# Patient Record
Sex: MALE | Race: WHITE | Employment: OTHER | URBAN - METROPOLITAN AREA
[De-identification: names, ages, dates, MRNs, and addresses within clinical notes are randomized per-mention and may not be internally consistent; named-entity substitution may affect disease eponyms.]

---

## 2018-05-09 ENCOUNTER — OFFICE VISIT (OUTPATIENT)
Dept: NEUROLOGY | Age: 83
End: 2018-05-09

## 2018-05-09 DIAGNOSIS — G62.9 PERIPHERAL NERVE DISORDER: Primary | ICD-10-CM

## 2018-05-09 DIAGNOSIS — M54.16 LUMBAR RADICULOPATHY: ICD-10-CM

## 2018-05-09 NOTE — PROGRESS NOTES
This was an elective EMG and nerve conduction for considerations of neuropathy and/or radiculopathy. Patient history includes a brief note about patient's subjective note of abnormal sensibility in the soles of both feet. No known history of diabetes. Remote history of seeing neurologist in [place] but I am not sure with the association was at the time? Has never had an EMG and nerve conduction test accomplished. EMG and nerve conduction findings. 1.  EMG portion involve sampling representative muscles of both lower extremities and the associated paraspinal groups. Needle insertion and probing was unrevealing and did not elicit spontaneous activity. No acute denervation chronic denervation/reinnervation or myopathic potentials seen. Motor unit recruitment as to number morphology and time sequencing was all normal.  Patient tolerated the procedure extremely well and there was no compromise as to exam quality based on patient responses. 2.  The nerve conduction part showed no sensory's obtained for either lower extremity. There was a slight decrease nerve conduction velocity for both tibial motors at the knees. There was a prolonged right tibial F wave of slight nature. Normal H response on the left and slightly prolonged on the left in comparison. 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 The prolonged slight delay in right tibial F wave compared to left is of uncertain if any significance. Also the right H response compared to left is of uncertain, if any significance, in this case. Clinical correlation is advised.   BIJAL COURTNEY.

## 2018-05-09 NOTE — PROGRESS NOTES
EMG/ NCS Report   [facility]  [facility], [address] [place], [address]   Ph: [phone]   FAX: [phone]/ [phone]  Test Date:  2018    Patient: Germán Parra : 1935 Physician: Jorge Montenegro, Lorenza Lundborg, MD   Sex: Male Height: ' \" Ref Phys: Andrew Vargas D.P.M   ID#: [id number] Weight:  lbs. Technician: Haim Norman     Patient History / Exam:  CC:NEUROPATHY VS RADICULOPATHY          EMG & NCV Findings:  Evaluation of the left Fibular motor nerve showed normal distal onset latency (3.7 ms), reduced amplitude (0.8 mV), normal conduction velocity (B Fib-Ankle, 41 m/s), and normal conduction velocity (Poplt-B Fib, 63 m/s). The right Fibular motor nerve showed normal distal onset latency (3.7 ms), normal amplitude (1.1 mV), normal conduction velocity (B Fib-Ankle, 41 m/s), and normal conduction velocity (Poplt-B Fib, 50 m/s). The left tibial motor and the right tibial motor nerves showed normal distal onset latency (L4.4, R5.2 ms), normal amplitude (L1.8, R1.4 mV), and decreased conduction velocity (Knee-Ankle, L35, R36 m/s). The left Sup Fibular sensory nerve showed no response (Lower leg) and no response (Site 2). The right Sup Fibular sensory nerve showed no response (Lower leg), no response (Site 2), and no response (Site 3). The left sural sensory and the right sural sensory nerves showed no response (Calf) and no response (Site 2). F Wave studies indicate that the right tibial F wave has prolonged latency (58.39 ms). All remaining F Wave latencies were within normal limits. Left vs. Right comparison data for the tibial F wave indicates abnormal L-R latency difference (6.08 ms). Left vs. Right comparison data for the tibial H-reflex indicates abnormal L-R latency difference (8.13 ms). All examined muscles (as indicated in the following table) showed no evidence of electrical instability. Impression:        ___________________________  Yasmin Cherry IV, MD      Nerve Conduction Studies  Anti Sensory Summary Table     Stim Site NR Peak (ms) Norm Peak (ms) P-T Amp (µV) Norm P-T Amp Site1 Site2 Dist (cm)   Left Sup Fibular Anti Sensory (Lat ankle)  32.9°C   Lower leg NR  <4.6  >4 Lower leg Lat ankle 10.0   Site 2 NR          Right Sup Fibular Anti Sensory (Lat ankle)  32.9°C   Lower leg NR  <4.6  >4 Lower leg Lat ankle 10.0   Site 2 NR          Site 3 NR          Left Sural Anti Sensory (Lat Mall)  33.9°C   Calf NR  <4.5  >4.0 Calf Lat Mall 14.0   Site 2 NR          Right Sural Anti Sensory (Lat Mall)  34.5°C   Calf NR  <4.5  >4.0 Calf Lat Mall 14.0   Site 2 NR            Motor Summary Table     Stim Site NR Onset (ms) Norm Onset (ms) O-P Amp (mV) Norm O-P Amp Amp (Prev) (%) Site1 Site2 Dist (cm) Kartik (m/s) Norm Kartik (m/s)   Left Fibular Motor (Ext Dig Brev)  31.9°C   Ankle    3.7 <6.5 0.8 >1.1 100.0 Ankle Ext Dig Brev 8.0     B Fib    12.0  0.8  100.0 B Fib Ankle 34.0 41 >38   Poplt    13.6  0.8  100.0 Poplt B Fib 10.0 63 >42   Right Fibular Motor (Ext Dig Brev)  32.3°C   Ankle    3.7 <6.5 1.1 >1.1 100.0 Ankle Ext Dig Brev 8.0     B Fib    12.0  1.2  109.1 B Fib Ankle 34.0 41 >38   Poplt    14.0  1.2  100.0 Poplt B Fib 10.0 50 >42   Left Tibial Motor (Abd Soto Brev)  31.2°C   Ankle    4.4 <6.1 1.8 >1.1 100.0 Ankle Abd Soto Brev 8.0     Knee    15.6  1.4  77.8 Knee Ankle 39.0 35 >39   Right Tibial Motor (Abd Soto Brev)  31.9°C   Ankle    5.2 <6.1 1.4 >1.1 100.0 Ankle Abd Soto Brev 8.0     Knee    16.2  0.9  64.3 Knee Ankle 40.0 36 >39     F Wave Studies     NR F-Lat (ms) Lat Norm (ms) L-R F-Lat (ms) L-R Lat Norm   Left Tibial (Mrkrs) (Abd Hallucis)  31.1°C      52.31 <56 6.08 <5.7   Right Tibial (Mrkrs) (Abd Hallucis)  31.8°C      58.39 <56 6.08 <5.7     H Reflex Studies     NR H-Lat (ms) L-R H-Lat (ms) L-R Lat Norm   Left Tibial (Gastroc)  31.1°C      31.87 8.13 <2.0   Right Tibial (Gastroc) 31.5°C      40.00 8.13 <2.0     EMG     Side Muscle Nerve Root Ins Act Fibs Psw Recrt Duration Amp Poly Comment   Right Ext Dig Brev Dp Br Peron L5, S1 Nml Nml Nml Nml Nml Nml Nml    Right AntTibialis Dp Br Peron L4-5 Nml Nml Nml Nml Nml Nml Nml    Right MedGastroc Tibial S1-2 Nml Nml Nml Nml Nml Nml Nml    Right VastusMed Femoral L2-4 Nml Nml Nml Nml Nml Nml Nml    Right BicepsFemL Sciatic L5-S2 Nml Nml Nml Nml Nml Nml Nml    Left Ext Dig Brev Dp Br Peron L5, S1 Nml Nml Nml Nml Nml Nml Nml    Left AntTibialis Dp Br Peron L4-5 Nml Nml Nml Nml Nml Nml Nml    Left MedGastroc Tibial S1-2 Nml Nml Nml Nml Nml Nml Nml    Left VastusMed Femoral L2-4 Nml Nml Nml Nml Nml Nml Nml    Left BicepsFemL Sciatic L5-S2 Nml Nml Nml Nml Nml Nml Nml    Left Mid Lumb Parasp Rami L4,5 Nml Nml Nml Nml Nml Nml Nml    Right Mid Lumb Parasp Rami L4,5 Nml Nml Nml Nml Nml Nml Nml                Nerve Conduction Studies  Anti Sensory Left/Right Comparison     Stim Site L Lat (ms) R Lat (ms) L-R Lat (ms) L Amp (µV) R Amp (µV) L-R Amp (%) Site1 Site2 L Kartik (m/s) R Kartik (m/s) L-R Kartik (m/s)   Sup Fibular Anti Sensory (Lat ankle)  32.9°C   Lower leg       Lower leg Lat ankle      Site 2              Sural Anti Sensory (Lat Mall)  33.9°C   Calf       Calf Lat Mall      Site 2                Motor Left/Right Comparison     Stim Site L Lat (ms) R Lat (ms) L-R Lat (ms) L Amp (mV) R Amp (mV) L-R Amp (%) Site1 Site2 L Kartik (m/s) R Kartik (m/s) L-R Kartik (m/s)   Fibular Motor (Ext Dig Brev)  31.9°C   Ankle 3.7 3.7 0.0 0.8 1.1 27.3 Ankle Ext Dig Brev      B Fib 12.0 12.0 0.0 0.8 1.2 33.3 B Fib Ankle 41 41 0   Poplt 13.6 14.0 0.4 0.8 1.2 33.3 Poplt B Fib 63 50 13   Tibial Motor (Abd Soto Brev)  31.2°C   Ankle 4.4 5.2 0.8 1.8 1.4 22.2 Ankle Abd Soto Brev      Knee 15.6 16.2 0.6 1.4 0.9 35.7 Knee Ankle 35 36 1         Waveforms:

## 2020-07-17 ENCOUNTER — HOSPITAL ENCOUNTER (OUTPATIENT)
Dept: MRI IMAGING | Age: 85
Discharge: HOME | End: 2020-07-17
Attending: PHYSICAL MEDICINE & REHABILITATION
Payer: MEDICARE

## 2020-07-17 DIAGNOSIS — Z79.01 ANTICOAGULATED ON COUMADIN: ICD-10-CM

## 2020-07-17 DIAGNOSIS — M54.50 LOW BACK PAIN: ICD-10-CM

## 2020-07-17 DIAGNOSIS — M54.16 RADICULOPATHY, LUMBAR REGION: ICD-10-CM

## 2020-07-17 DIAGNOSIS — G89.11 ACUTE PAIN DUE TO TRAUMA: ICD-10-CM

## 2020-07-17 PROCEDURE — 72148 MRI LUMBAR SPINE W/O DYE: CPT

## 2023-01-01 ENCOUNTER — HOME CARE VISIT (OUTPATIENT)
Age: 88
End: 2023-01-01
Payer: MEDICARE

## 2023-01-01 ENCOUNTER — HOSPICE ADMISSION (OUTPATIENT)
Age: 88
End: 2023-01-01
Payer: MEDICARE

## 2023-01-01 VITALS
RESPIRATION RATE: 36 BRPM | DIASTOLIC BLOOD PRESSURE: 62 MMHG | TEMPERATURE: 97.7 F | SYSTOLIC BLOOD PRESSURE: 132 MMHG | HEART RATE: 87 BPM

## 2023-01-01 PROCEDURE — 0651 HSPC ROUTINE HOME CARE

## 2023-01-01 PROCEDURE — 2500000001 HSPC NON INJECTABLE MED

## 2023-01-01 PROCEDURE — 0656 HSPC GENERAL INPATIENT

## 2023-01-01 PROCEDURE — G0299 HHS/HOSPICE OF RN EA 15 MIN: HCPCS

## 2023-01-01 ASSESSMENT — ENCOUNTER SYMPTOMS
COUGH: 1
COUGH CHARACTERISTICS: MOIST

## 2023-09-10 ENCOUNTER — APPOINTMENT (OUTPATIENT)
Facility: HOSPITAL | Age: 88
DRG: 280 | End: 2023-09-10
Payer: MEDICARE

## 2023-09-10 ENCOUNTER — HOSPITAL ENCOUNTER (INPATIENT)
Facility: HOSPITAL | Age: 88
LOS: 4 days | Discharge: HOME | DRG: 280 | End: 2023-09-14
Attending: STUDENT IN AN ORGANIZED HEALTH CARE EDUCATION/TRAINING PROGRAM | Admitting: HOSPITALIST
Payer: MEDICARE

## 2023-09-10 DIAGNOSIS — J96.02 ACUTE RESPIRATORY FAILURE WITH HYPOXIA AND HYPERCAPNIA (HCC): Primary | ICD-10-CM

## 2023-09-10 DIAGNOSIS — R07.9 CHEST PAIN: ICD-10-CM

## 2023-09-10 DIAGNOSIS — J96.01 ACUTE RESPIRATORY FAILURE WITH HYPOXIA (HCC): ICD-10-CM

## 2023-09-10 DIAGNOSIS — J96.01 ACUTE RESPIRATORY FAILURE WITH HYPOXIA AND HYPERCAPNIA (HCC): Primary | ICD-10-CM

## 2023-09-10 DIAGNOSIS — R77.8 ELEVATED TROPONIN: ICD-10-CM

## 2023-09-10 DIAGNOSIS — J81.0 ACUTE PULMONARY EDEMA (HCC): ICD-10-CM

## 2023-09-10 DIAGNOSIS — N17.9 AKI (ACUTE KIDNEY INJURY) (HCC): ICD-10-CM

## 2023-09-10 LAB
ALBUMIN SERPL-MCNC: 3.8 G/DL (ref 3.5–5)
ALBUMIN/GLOB SERPL: 0.8 (ref 1.1–2.2)
ALP SERPL-CCNC: 74 U/L (ref 45–117)
ALT SERPL-CCNC: 19 U/L (ref 12–78)
ANION GAP SERPL CALC-SCNC: 9 MMOL/L (ref 5–15)
APPEARANCE UR: CLEAR
AST SERPL-CCNC: 31 U/L (ref 15–37)
BACTERIA URNS QL MICRO: NEGATIVE /HPF
BASOPHILS # BLD: 0.1 K/UL (ref 0–0.1)
BASOPHILS NFR BLD: 0 % (ref 0–1)
BILIRUB SERPL-MCNC: 0.7 MG/DL (ref 0.2–1)
BILIRUB UR QL: NEGATIVE
BUN SERPL-MCNC: 24 MG/DL (ref 6–20)
BUN/CREAT SERPL: 14 (ref 12–20)
CALCIUM SERPL-MCNC: 8.7 MG/DL (ref 8.5–10.1)
CHLORIDE SERPL-SCNC: 102 MMOL/L (ref 97–108)
CHOLEST SERPL-MCNC: 127 MG/DL
CK SERPL-CCNC: 92 U/L (ref 39–308)
CO2 SERPL-SCNC: 29 MMOL/L (ref 21–32)
COLOR UR: NORMAL
COMMENT:: NORMAL
CREAT SERPL-MCNC: 1.67 MG/DL (ref 0.7–1.3)
DIFFERENTIAL METHOD BLD: ABNORMAL
EKG DIAGNOSIS: NORMAL
EKG DIAGNOSIS: NORMAL
EKG Q-T INTERVAL: 376 MS
EKG Q-T INTERVAL: 466 MS
EKG QRS DURATION: 88 MS
EKG QRS DURATION: 90 MS
EKG QTC CALCULATION (BAZETT): 454 MS
EKG QTC CALCULATION (BAZETT): 499 MS
EKG R AXIS: -34 DEGREES
EKG R AXIS: -43 DEGREES
EKG T AXIS: 103 DEGREES
EKG T AXIS: 188 DEGREES
EKG VENTRICULAR RATE: 69 BPM
EKG VENTRICULAR RATE: 88 BPM
EOSINOPHIL # BLD: 0.1 K/UL (ref 0–0.4)
EOSINOPHIL NFR BLD: 1 % (ref 0–7)
EPITH CASTS URNS QL MICRO: NORMAL /LPF
ERYTHROCYTE [DISTWIDTH] IN BLOOD BY AUTOMATED COUNT: 14.8 % (ref 11.5–14.5)
ERYTHROCYTE [DISTWIDTH] IN BLOOD BY AUTOMATED COUNT: 15.1 % (ref 11.5–14.5)
GLOBULIN SER CALC-MCNC: 5 G/DL (ref 2–4)
GLUCOSE SERPL-MCNC: 160 MG/DL (ref 65–100)
GLUCOSE UR STRIP.AUTO-MCNC: NEGATIVE MG/DL
HCO3 BLDV-SCNC: 28.1 MMOL/L (ref 23–28)
HCT VFR BLD AUTO: 42.7 % (ref 36.6–50.3)
HCT VFR BLD AUTO: 47.5 % (ref 36.6–50.3)
HDLC SERPL-MCNC: 52 MG/DL
HDLC SERPL: 2.4 (ref 0–5)
HGB BLD-MCNC: 14.1 G/DL (ref 12.1–17)
HGB BLD-MCNC: 15.4 G/DL (ref 12.1–17)
HGB UR QL STRIP: NEGATIVE
HYALINE CASTS URNS QL MICRO: NORMAL /LPF (ref 0–2)
IMM GRANULOCYTES # BLD AUTO: 0.1 K/UL (ref 0–0.04)
IMM GRANULOCYTES NFR BLD AUTO: 1 % (ref 0–0.5)
INR PPP: 2.5 (ref 0.9–1.1)
KETONES UR QL STRIP.AUTO: NEGATIVE MG/DL
LACTATE SERPL-SCNC: 1.2 MMOL/L (ref 0.4–2)
LDLC SERPL CALC-MCNC: 60.8 MG/DL (ref 0–100)
LEUKOCYTE ESTERASE UR QL STRIP.AUTO: NEGATIVE
LYMPHOCYTES # BLD: 2.4 K/UL (ref 0.8–3.5)
LYMPHOCYTES NFR BLD: 21 % (ref 12–49)
MCH RBC QN AUTO: 28.9 PG (ref 26–34)
MCH RBC QN AUTO: 29.6 PG (ref 26–34)
MCHC RBC AUTO-ENTMCNC: 32.4 G/DL (ref 30–36.5)
MCHC RBC AUTO-ENTMCNC: 33 G/DL (ref 30–36.5)
MCV RBC AUTO: 87.5 FL (ref 80–99)
MCV RBC AUTO: 91.2 FL (ref 80–99)
MONOCYTES # BLD: 0.6 K/UL (ref 0–1)
MONOCYTES NFR BLD: 5 % (ref 5–13)
NEUTS SEG # BLD: 8.6 K/UL (ref 1.8–8)
NEUTS SEG NFR BLD: 72 % (ref 32–75)
NITRITE UR QL STRIP.AUTO: NEGATIVE
NRBC # BLD: 0 K/UL (ref 0–0.01)
NRBC # BLD: 0 K/UL (ref 0–0.01)
NRBC BLD-RTO: 0 PER 100 WBC
NRBC BLD-RTO: 0 PER 100 WBC
NT PRO BNP: 1147 PG/ML (ref 0–450)
PCO2 BLDV: 57 MMHG (ref 41–51)
PH BLDV: 7.3 (ref 7.32–7.42)
PH UR STRIP: 7 (ref 5–8)
PLATELET # BLD AUTO: 276 K/UL (ref 150–400)
PLATELET # BLD AUTO: 291 K/UL (ref 150–400)
PMV BLD AUTO: 10.2 FL (ref 8.9–12.9)
PMV BLD AUTO: 10.2 FL (ref 8.9–12.9)
PO2 BLDV: <13 MMHG (ref 25–40)
POTASSIUM SERPL-SCNC: 4.1 MMOL/L (ref 3.5–5.1)
PROT SERPL-MCNC: 8.8 G/DL (ref 6.4–8.2)
PROT UR STRIP-MCNC: NEGATIVE MG/DL
PROTHROMBIN TIME: 23.7 SEC (ref 9–11.1)
RBC # BLD AUTO: 4.88 M/UL (ref 4.1–5.7)
RBC # BLD AUTO: 5.21 M/UL (ref 4.1–5.7)
RBC #/AREA URNS HPF: NORMAL /HPF (ref 0–5)
SARS-COV-2 RDRP RESP QL NAA+PROBE: NOT DETECTED
SERVICE CMNT-IMP: ABNORMAL
SODIUM SERPL-SCNC: 140 MMOL/L (ref 136–145)
SOURCE: NORMAL
SP GR UR REFRACTOMETRY: 1.01 (ref 1–1.03)
SPECIMEN HOLD: NORMAL
SPECIMEN TYPE: ABNORMAL
TRIGL SERPL-MCNC: 71 MG/DL
TROPONIN I SERPL HS-MCNC: 341 NG/L (ref 0–76)
TROPONIN I SERPL HS-MCNC: 4127 NG/L (ref 0–76)
TROPONIN I SERPL HS-MCNC: ABNORMAL NG/L (ref 0–76)
TROPONIN I SERPL HS-MCNC: ABNORMAL NG/L (ref 0–76)
UFH PPP CHRO-ACNC: 0.57 IU/ML
UFH PPP CHRO-ACNC: <0.1 IU/ML
URINE CULTURE IF INDICATED: NORMAL
UROBILINOGEN UR QL STRIP.AUTO: 0.2 EU/DL (ref 0.2–1)
VLDLC SERPL CALC-MCNC: 14.2 MG/DL
WBC # BLD AUTO: 11.9 K/UL (ref 4.1–11.1)
WBC # BLD AUTO: 9.8 K/UL (ref 4.1–11.1)
WBC URNS QL MICRO: NORMAL /HPF (ref 0–4)

## 2023-09-10 PROCEDURE — 6370000000 HC RX 637 (ALT 250 FOR IP): Performed by: STUDENT IN AN ORGANIZED HEALTH CARE EDUCATION/TRAINING PROGRAM

## 2023-09-10 PROCEDURE — 85610 PROTHROMBIN TIME: CPT

## 2023-09-10 PROCEDURE — 71045 X-RAY EXAM CHEST 1 VIEW: CPT

## 2023-09-10 PROCEDURE — 83605 ASSAY OF LACTIC ACID: CPT

## 2023-09-10 PROCEDURE — 2580000003 HC RX 258: Performed by: HOSPITALIST

## 2023-09-10 PROCEDURE — 93005 ELECTROCARDIOGRAM TRACING: CPT | Performed by: STUDENT IN AN ORGANIZED HEALTH CARE EDUCATION/TRAINING PROGRAM

## 2023-09-10 PROCEDURE — 94660 CPAP INITIATION&MGMT: CPT

## 2023-09-10 PROCEDURE — 93010 ELECTROCARDIOGRAM REPORT: CPT | Performed by: STUDENT IN AN ORGANIZED HEALTH CARE EDUCATION/TRAINING PROGRAM

## 2023-09-10 PROCEDURE — 6370000000 HC RX 637 (ALT 250 FOR IP): Performed by: INTERNAL MEDICINE

## 2023-09-10 PROCEDURE — 6360000002 HC RX W HCPCS: Performed by: HOSPITALIST

## 2023-09-10 PROCEDURE — 6360000002 HC RX W HCPCS: Performed by: STUDENT IN AN ORGANIZED HEALTH CARE EDUCATION/TRAINING PROGRAM

## 2023-09-10 PROCEDURE — 81001 URINALYSIS AUTO W/SCOPE: CPT

## 2023-09-10 PROCEDURE — 82803 BLOOD GASES ANY COMBINATION: CPT

## 2023-09-10 PROCEDURE — 51701 INSERT BLADDER CATHETER: CPT

## 2023-09-10 PROCEDURE — 83880 ASSAY OF NATRIURETIC PEPTIDE: CPT

## 2023-09-10 PROCEDURE — 2700000000 HC OXYGEN THERAPY PER DAY

## 2023-09-10 PROCEDURE — 94640 AIRWAY INHALATION TREATMENT: CPT

## 2023-09-10 PROCEDURE — 85520 HEPARIN ASSAY: CPT

## 2023-09-10 PROCEDURE — 80053 COMPREHEN METABOLIC PANEL: CPT

## 2023-09-10 PROCEDURE — 5A09357 ASSISTANCE WITH RESPIRATORY VENTILATION, LESS THAN 24 CONSECUTIVE HOURS, CONTINUOUS POSITIVE AIRWAY PRESSURE: ICD-10-PCS | Performed by: INTERNAL MEDICINE

## 2023-09-10 PROCEDURE — 85025 COMPLETE CBC W/AUTO DIFF WBC: CPT

## 2023-09-10 PROCEDURE — 87635 SARS-COV-2 COVID-19 AMP PRB: CPT

## 2023-09-10 PROCEDURE — 2000000000 HC ICU R&B

## 2023-09-10 PROCEDURE — 99223 1ST HOSP IP/OBS HIGH 75: CPT | Performed by: STUDENT IN AN ORGANIZED HEALTH CARE EDUCATION/TRAINING PROGRAM

## 2023-09-10 PROCEDURE — 94761 N-INVAS EAR/PLS OXIMETRY MLT: CPT

## 2023-09-10 PROCEDURE — 87040 BLOOD CULTURE FOR BACTERIA: CPT

## 2023-09-10 PROCEDURE — 99285 EMERGENCY DEPT VISIT HI MDM: CPT

## 2023-09-10 PROCEDURE — 82550 ASSAY OF CK (CPK): CPT

## 2023-09-10 PROCEDURE — 36415 COLL VENOUS BLD VENIPUNCTURE: CPT

## 2023-09-10 PROCEDURE — 6370000000 HC RX 637 (ALT 250 FOR IP): Performed by: HOSPITALIST

## 2023-09-10 PROCEDURE — 80061 LIPID PANEL: CPT

## 2023-09-10 PROCEDURE — 84484 ASSAY OF TROPONIN QUANT: CPT

## 2023-09-10 PROCEDURE — 51798 US URINE CAPACITY MEASURE: CPT

## 2023-09-10 PROCEDURE — 85027 COMPLETE CBC AUTOMATED: CPT

## 2023-09-10 PROCEDURE — 93010 ELECTROCARDIOGRAM REPORT: CPT | Performed by: SPECIALIST

## 2023-09-10 RX ORDER — ATORVASTATIN CALCIUM 20 MG/1
40 TABLET, FILM COATED ORAL NIGHTLY
Status: DISCONTINUED | OUTPATIENT
Start: 2023-09-10 | End: 2023-09-10

## 2023-09-10 RX ORDER — FUROSEMIDE 20 MG/1
20 TABLET ORAL DAILY
Status: ON HOLD | COMMUNITY
End: 2023-09-14 | Stop reason: HOSPADM

## 2023-09-10 RX ORDER — FUROSEMIDE 10 MG/ML
40 INJECTION INTRAMUSCULAR; INTRAVENOUS DAILY
Status: DISCONTINUED | OUTPATIENT
Start: 2023-09-10 | End: 2023-09-11

## 2023-09-10 RX ORDER — ONDANSETRON 4 MG/1
4 TABLET, ORALLY DISINTEGRATING ORAL EVERY 8 HOURS PRN
Status: DISCONTINUED | OUTPATIENT
Start: 2023-09-10 | End: 2023-09-14 | Stop reason: HOSPADM

## 2023-09-10 RX ORDER — IPRATROPIUM BROMIDE AND ALBUTEROL SULFATE 2.5; .5 MG/3ML; MG/3ML
1 SOLUTION RESPIRATORY (INHALATION) ONCE
Status: COMPLETED | OUTPATIENT
Start: 2023-09-10 | End: 2023-09-10

## 2023-09-10 RX ORDER — ASPIRIN 81 MG/1
162 TABLET, CHEWABLE ORAL
Status: DISCONTINUED | OUTPATIENT
Start: 2023-09-10 | End: 2023-09-10

## 2023-09-10 RX ORDER — ONDANSETRON 2 MG/ML
4 INJECTION INTRAMUSCULAR; INTRAVENOUS EVERY 6 HOURS PRN
Status: DISCONTINUED | OUTPATIENT
Start: 2023-09-10 | End: 2023-09-14 | Stop reason: HOSPADM

## 2023-09-10 RX ORDER — PANTOPRAZOLE SODIUM 40 MG/1
40 TABLET, DELAYED RELEASE ORAL
Status: DISCONTINUED | OUTPATIENT
Start: 2023-09-10 | End: 2023-09-14 | Stop reason: HOSPADM

## 2023-09-10 RX ORDER — HEPARIN SODIUM 1000 [USP'U]/ML
4000 INJECTION, SOLUTION INTRAVENOUS; SUBCUTANEOUS PRN
Status: DISCONTINUED | OUTPATIENT
Start: 2023-09-10 | End: 2023-09-13

## 2023-09-10 RX ORDER — ATORVASTATIN CALCIUM 20 MG/1
80 TABLET, FILM COATED ORAL NIGHTLY
Status: DISCONTINUED | OUTPATIENT
Start: 2023-09-10 | End: 2023-09-11

## 2023-09-10 RX ORDER — OXYBUTYNIN CHLORIDE 5 MG/1
5 TABLET ORAL 2 TIMES DAILY
COMMUNITY
Start: 2023-08-31

## 2023-09-10 RX ORDER — ASPIRIN 81 MG/1
81 TABLET ORAL DAILY
Status: DISCONTINUED | OUTPATIENT
Start: 2023-09-10 | End: 2023-09-13

## 2023-09-10 RX ORDER — ACETAMINOPHEN 650 MG/1
650 SUPPOSITORY RECTAL EVERY 6 HOURS PRN
Status: DISCONTINUED | OUTPATIENT
Start: 2023-09-10 | End: 2023-09-14 | Stop reason: HOSPADM

## 2023-09-10 RX ORDER — SODIUM CHLORIDE 9 MG/ML
INJECTION, SOLUTION INTRAVENOUS CONTINUOUS
Status: DISCONTINUED | OUTPATIENT
Start: 2023-09-11 | End: 2023-09-12

## 2023-09-10 RX ORDER — ACETAMINOPHEN 325 MG/1
650 TABLET ORAL EVERY 6 HOURS PRN
Status: DISCONTINUED | OUTPATIENT
Start: 2023-09-10 | End: 2023-09-14 | Stop reason: HOSPADM

## 2023-09-10 RX ORDER — NITROGLYCERIN 0.4 MG/1
0.4 TABLET SUBLINGUAL EVERY 5 MIN PRN
Status: DISCONTINUED | OUTPATIENT
Start: 2023-09-10 | End: 2023-09-14 | Stop reason: HOSPADM

## 2023-09-10 RX ORDER — SODIUM CHLORIDE 9 MG/ML
INJECTION, SOLUTION INTRAVENOUS PRN
Status: DISCONTINUED | OUTPATIENT
Start: 2023-09-10 | End: 2023-09-14 | Stop reason: HOSPADM

## 2023-09-10 RX ORDER — SODIUM CHLORIDE 0.9 % (FLUSH) 0.9 %
5-40 SYRINGE (ML) INJECTION PRN
Status: DISCONTINUED | OUTPATIENT
Start: 2023-09-10 | End: 2023-09-13

## 2023-09-10 RX ORDER — FUROSEMIDE 10 MG/ML
60 INJECTION INTRAMUSCULAR; INTRAVENOUS ONCE
Status: COMPLETED | OUTPATIENT
Start: 2023-09-10 | End: 2023-09-10

## 2023-09-10 RX ORDER — HEPARIN SODIUM 10000 [USP'U]/100ML
5-30 INJECTION, SOLUTION INTRAVENOUS CONTINUOUS
Status: DISCONTINUED | OUTPATIENT
Start: 2023-09-10 | End: 2023-09-13

## 2023-09-10 RX ORDER — SODIUM CHLORIDE 0.9 % (FLUSH) 0.9 %
5-40 SYRINGE (ML) INJECTION EVERY 12 HOURS SCHEDULED
Status: DISCONTINUED | OUTPATIENT
Start: 2023-09-10 | End: 2023-09-13

## 2023-09-10 RX ORDER — POLYETHYLENE GLYCOL 3350 17 G/17G
17 POWDER, FOR SOLUTION ORAL DAILY PRN
Status: DISCONTINUED | OUTPATIENT
Start: 2023-09-10 | End: 2023-09-14 | Stop reason: HOSPADM

## 2023-09-10 RX ORDER — HEPARIN SODIUM 1000 [USP'U]/ML
60 INJECTION, SOLUTION INTRAVENOUS; SUBCUTANEOUS ONCE
Status: DISCONTINUED | OUTPATIENT
Start: 2023-09-10 | End: 2023-09-10

## 2023-09-10 RX ORDER — WARFARIN SODIUM 3 MG/1
3 TABLET ORAL DAILY
COMMUNITY

## 2023-09-10 RX ORDER — SUCRALFATE 1 G/1
1 TABLET ORAL 2 TIMES DAILY
COMMUNITY
Start: 2023-08-23

## 2023-09-10 RX ORDER — HEPARIN SODIUM 1000 [USP'U]/ML
2000 INJECTION, SOLUTION INTRAVENOUS; SUBCUTANEOUS PRN
Status: DISCONTINUED | OUTPATIENT
Start: 2023-09-10 | End: 2023-09-13

## 2023-09-10 RX ADMIN — SODIUM CHLORIDE, PRESERVATIVE FREE 10 ML: 5 INJECTION INTRAVENOUS at 13:11

## 2023-09-10 RX ADMIN — NITROGLYCERIN 0.5 INCH: 20 OINTMENT TOPICAL at 04:01

## 2023-09-10 RX ADMIN — PHYTONADIONE 2.5 MG: 10 INJECTION, EMULSION INTRAMUSCULAR; INTRAVENOUS; SUBCUTANEOUS at 12:11

## 2023-09-10 RX ADMIN — ASPIRIN 81 MG: 81 TABLET, COATED ORAL at 13:15

## 2023-09-10 RX ADMIN — ATORVASTATIN CALCIUM 80 MG: 20 TABLET, FILM COATED ORAL at 21:01

## 2023-09-10 RX ADMIN — FUROSEMIDE 60 MG: 10 INJECTION, SOLUTION INTRAMUSCULAR; INTRAVENOUS at 04:01

## 2023-09-10 RX ADMIN — HEPARIN SODIUM AND DEXTROSE 12 UNITS/KG/HR: 10000; 5 INJECTION INTRAVENOUS at 13:05

## 2023-09-10 RX ADMIN — IPRATROPIUM BROMIDE AND ALBUTEROL SULFATE 1 DOSE: 2.5; .5 SOLUTION RESPIRATORY (INHALATION) at 03:50

## 2023-09-10 RX ADMIN — FUROSEMIDE 40 MG: 10 INJECTION, SOLUTION INTRAMUSCULAR; INTRAVENOUS at 09:10

## 2023-09-10 RX ADMIN — METOPROLOL TARTRATE 25 MG: 25 TABLET, FILM COATED ORAL at 09:10

## 2023-09-10 RX ADMIN — SODIUM CHLORIDE, PRESERVATIVE FREE 10 ML: 5 INJECTION INTRAVENOUS at 21:01

## 2023-09-10 RX ADMIN — PANTOPRAZOLE SODIUM 40 MG: 40 TABLET, DELAYED RELEASE ORAL at 06:46

## 2023-09-10 ASSESSMENT — PAIN SCALES - GENERAL: PAINLEVEL_OUTOF10: 4

## 2023-09-10 ASSESSMENT — PAIN DESCRIPTION - LOCATION: LOCATION: CHEST

## 2023-09-10 ASSESSMENT — PAIN DESCRIPTION - ORIENTATION: ORIENTATION: RIGHT;LEFT

## 2023-09-10 ASSESSMENT — LIFESTYLE VARIABLES
HOW OFTEN DO YOU HAVE A DRINK CONTAINING ALCOHOL: PATIENT DECLINED
HOW MANY STANDARD DRINKS CONTAINING ALCOHOL DO YOU HAVE ON A TYPICAL DAY: PATIENT DECLINED

## 2023-09-10 NOTE — CONSULTS
[facility]                    Cardiology Care Note     [x]Initial Encounter     []Follow-up    Patient Name: Ivonne Graves - WNV:[id number] - IMS:[id number]  Primary Cardiologist: Aubrie Arroyo Cardiologist: Dinora Rush DO     Reason for encounter: nstemi    HPI:       Ivonne Graves is a 81 y.o. male with PMH significant for atrial fibrillation on warfarin presented to ED for evaluation of chest pain. Patient reports over the last 3 weeks has had intermittent chest pain symptoms. Has been evaluated by his primary care physician. It is thought to be potential gastric in etiology. Patient has great difficulty describing the character of the symptoms. Does not appear to be related specifically to exertion. Reports the chest pain symptoms started to get worse about a week ago and became significantly worse just prior to arrival.  This morning patient has no ongoing chest pain or chest pressure symptoms. Subjective:      Iovnne Graves reports chest pain. Assessment and Plan       Nstemi. EKG with anterior T wave inversions. No ongoing chest pain symptoms nor ST elevation that would warrant emergent heart catheterization. Troponin has risen to 18,000 this morning. Persistent atrial fibrillation    Elevated creatinine, likely chronic kidney disease    -Hold patient warfarin and administer vitamin K for planned cardiac catheterization in the a.m.  -Add heparin  -Continue beta-blocker and topical nitroglycerin  -Continue aspirin  -Titrate atorvastatin 80 mg daily    Discussed with Dr. Neil Archer for heart catheterization tomorrow morning. N.p.o. at midnight. IV hydration at midnight to decrease likelihood of LARRY with planned catheterization    If patient develops unstable symptoms we will planned emergent heart catheterization today.       ____________________________________________________________    Cardiac testing  No results found for this or any previous visit.     No results

## 2023-09-10 NOTE — ED TRIAGE NOTES
Pt utilizes a wheelchair to arrive in treatment area. Pt states that around 0000 he started feeling shortness of breath and having trouble breathing.   Pt states that he has not been feeling sick

## 2023-09-10 NOTE — ED PROVIDER NOTES
[facility] EMERGENCY DEPT  EMERGENCY DEPARTMENT ENCOUNTER      Pt Name: Agustina Cole  MRN: [id number]  [address] [place] 1935  Date of evaluation: 9/10/2023  Provider: Jacklyn Henley MD    CHIEF COMPLAINT       Chief Complaint   Patient presents with    Respiratory Distress     HISTORY OF PRESENT ILLNESS   (Location/Symptom, Timing/Onset, Context/Setting, Quality, Duration, Modifying Factors, Severity)  Note limiting factors. HPI  82 yo M with pmhx of afib, presents to the ER with daughter for evaluation of acute onset of sob that began approximately 1.5 hours PTA. Patient denies any recent increased cough (report chronic cough which is currently unchanged), URI symptoms, fevers or chills. Reports bilateral anterior chest wall pain, but tells me he has had similar symptoms in the past and was advised by his outpatient physicians that it might be \"gas\" as it tends to go away with use of TUMs. He denies lower extremity swelling, calf tenderness. Patient denies history of asthma, copd, chf. However, shares that he does take a fluid pill. Patient hypoxic on arrival, with noticeably increased WOB. Review of External Medical Records:     Nursing Notes were reviewed. REVIEW OF SYSTEMS    (2-9 systems for level 4, 10 or more for level 5)     Review of Systems   All other systems reviewed and are negative. Except as noted above the remainder of the review of systems was reviewed and negative.        PAST MEDICAL HISTORY     Past Medical History:   Diagnosis Date    Atrial fibrillation ([address])          SURGICAL HISTORY       Past Surgical History:   Procedure Laterality Date    CHOLECYSTECTOMY           CURRENT MEDICATIONS       Previous Medications    FUROSEMIDE (LASIX) 20 MG TABLET    Take 1 tablet by mouth    OXYBUTYNIN (DITROPAN) 5 MG TABLET    1 tablet    SUCRALFATE (CARAFATE) 1 GM TABLET    Take 1 tablet by mouth    WARFARIN (JANTOVEN) 3 MG TABLET    1 tablet       ALLERGIES     Patient has no known allergies. FAMILY HISTORY     History reviewed. No pertinent family history. SOCIAL HISTORY       Social History     Socioeconomic History    Marital status:      Spouse name: None    Number of children: None    Years of education: None    Highest education level: None   Tobacco Use    Smoking status: Never    Smokeless tobacco: Never   Substance and Sexual Activity    Alcohol use: Never    Drug use: Never           PHYSICAL EXAM    (up to 7 for level 4, 8 or more for level 5)     ED Triage Vitals   BP Temp Temp src Pulse Resp SpO2 Height Weight   -- -- -- -- -- -- -- --       There is no height or weight on file to calculate BMI. Physical Exam  Vitals and nursing note reviewed. Constitutional:       General: He is not in acute distress. Appearance: Normal appearance. He is normal weight. He is ill-appearing. HENT:      Head: Normocephalic and atraumatic. Nose: Nose normal.      Mouth/Throat:      Mouth: Mucous membranes are moist.   Eyes:      Extraocular Movements: Extraocular movements intact. Pupils: Pupils are equal, round, and reactive to light. Cardiovascular:      Rate and Rhythm: Normal rate and regular rhythm. Pulses: Normal pulses. Heart sounds: Normal heart sounds. Pulmonary:      Effort: Tachypnea, accessory muscle usage and respiratory distress present. Breath sounds: Rales present. Comments: +intermittent wet sounding cough, which patient report is chronic  Abdominal:      General: Abdomen is flat. Palpations: Abdomen is soft. Tenderness: There is no abdominal tenderness. Musculoskeletal:         General: Normal range of motion. Cervical back: Normal range of motion and neck supple. No tenderness. Right lower leg: No edema. Left lower leg: No edema. Skin:     General: Skin is warm and dry. Capillary Refill: Capillary refill takes less than 2 seconds. Neurological:      General: No focal deficit present.

## 2023-09-10 NOTE — PROGRESS NOTES
Hospitalist Progress Note      NAME:  Cory Pop   :  1935  MRM:  [id number]    Date/Time: 9/10/2023  1:36 PM           Assessment / Plan:     Given the patient's current clinical presentation, I have a high level of concern for decompensation if discharged from the emergency department. Complex decision making was performed, which includes reviewing the patient's available past medical records, laboratory results, and x-ray films. My assessment of this patient's clinical condition and my plan of care is as follows. Assessment / Plan:     Acute hypoxic respiratory failure requiring BiPAP due to pulmonary edema   NSTEMI type 1, trops 18K  - icu level of care  - hep gtt, vit k to reverse inr, discussed importance of asa  - npo mn for cath in am, unless develops hemodynamic instability, arrythmia, refractory chf, intractable chest pain, then meets requirements for immediate intervention     FLOR versus CKD with creatinine of 1.67  -- diurese iv lasix qd     Persistent A-fib  Hold coumadin, give k, give hep gtt  Rates controlled w metop     Overactive bladder with urinary incontinence - condom cath bladder scan q6h     Medical Decision Making:   I have personally reviewed the radiographs, laboratory data in Epic and decisions and statements above are based partially on this personal interpretation. Code Status: Full Code  DVT Prophylaxis: Coumadin  GI Prophylaxis: PPI   ___________________________________________________    Attending Physician: Ronal Lord DO        Subjective:     Chief Complaint:  shortness of breath    Has no pain in chest, pleasant  Fam at bedside, long advanced care discussion  Spoke about our concerns for his heart status and why cards taking for cath tomorrow and why not today    ROS:  (bold if positive, if negative)    Tolerating PT  Tolerating Diet          Objective:       Vitals:          Last 24hrs VS reviewed since prior progress note.  Most recent

## 2023-09-10 NOTE — H&P
Hospitalist Admission Note    NAME:  Chuy Tinoco   :  1935   MRN:  [id number]     Date/Time:  9/10/2023 6:34 AM    Patient PCP: Russ Harada. Please note that this dictation was completed with [employer], the computer voice recognition software. Quite often unanticipated grammatical, syntax, homophones, and other interpretive errors are inadvertently transcribed by the computer software. Please disregard these errors. Please excuse any errors that have escaped final proofreading  ________________________________________________________________________    Given the patient's current clinical presentation, I have a high level of concern for decompensation if discharged from the emergency department. Complex decision making was performed, which includes reviewing the patient's available past medical records, laboratory results, and x-ray films. My assessment of this patient's clinical condition and my plan of care is as follows. Assessment / Plan:    Acute hypoxic respiratory failure requiring BiPAP due to pulmonary edema   NSTEMI with elevated troponin 341  --Patient refused aspirin since he is on Coumadin. -- Start metoprolol 25 mg twice daily and Lipitor. Continue IV diuretic with Lasix 40 mg daily. -- Serial troponin, cardiology consult, echocardiogram  -- Trial off of BiPAP to nasal cannula and wean O2 as tolerated  -- SL nitro prn    FLOR versus CKD with creatinine of 1.67  -- No prior creatinine in the system. Monitor with diuresis. -- Bladder scan postvoid to evaluate for urinary retention  -- Check UA    Persistent A-fib  On Coumadin with therapeutic INR of 2.5  Hold Coumadin as patient may require cardiac catheterization. Switch to IV heparin when INR less than 2    Overactive bladder with urinary incontinence    Need medication reconciliation which patient is unable to provide at this time.   Daughter will bring in his home meds    Medical Decision Making:   I have personally

## 2023-09-10 NOTE — ACP (ADVANCE CARE PLANNING)
[facility]                                   Advance Care Planning Note    Name: Abhilash Coppola  YOB: 1935  MRN: [id number]  Admission Date: 9/10/2023  2:56 AM    Date of discussion: 9/10/2023    Active Diagnoses: These active diagnoses are of sufficient risk that focused discussion on advance care planning is indicated in order to allow the patient to thoughtfully consider personal goals of care, and if situations arise that prevent the ability to personally give input, to ensure appropriate representation of their personal desires for different levels and aggressiveness of care. Discussion:     Persons present and participating in discussion: Abhilash Abdon, Zac Matthew DO, Family    Topics Discussed:  Patient's medical condition and diagnosis: [ x ] yes [  ] no   Surrogate decision maker: [ x ] yes [  ] no   Patient's current physical function/cognitive function/frailty: [ x ] yes [  ] no   Code Status: [  x] yes [  ] no   Artificial Nutrition / Dialysis / Non-Invasive Ventilation / Blood Transfusion: [x  ] yes [  ] no  Potential Resources for home (durable medical equipment, home nursing, home O2): [  ] yes [ x ] no    Overview of Discussion: We discussed that Sara Crenshaw is having a heart attack. . We spoke about the difference between DNR and full code, and the idea of trial of intubation. He opts at the end of conversation for DNR. We spoke quite in depth about the subject, and he made a well informed decision of his own accord, in the presence of his family. Time Spent:     Total time spent face-to-face in education and discussion: 20 min.      Zac Matthew DO  Date of Service:  9/10/2023  1:52 PM

## 2023-09-10 NOTE — ED NOTES
TRANSFER - OUT REPORT:    Verbal report given to [place] [facility] on Abhilash Coppola  being transferred to [facility] for routine progression of patient care       Report consisted of patient's Situation, Background, Assessment and   Recommendations(SBAR). Information from the following report(s) ED Encounter Summary was reviewed with the receiving nurse. [place] Fall Assessment:    Presents to emergency department  because of falls (Syncope, seizure, or loss of consciousness): No  Age > 70: Yes  Altered Mental Status, Intoxication with alcohol or substance confusion (Disorientation, impaired judgment, poor safety awaremess, or inability to follow instructions): No  Impaired Mobility: Ambulates or transfers with assistive devices or assistance; Unable to ambulate or transer.: Yes  Nursing Judgement: No          Lines:   Peripheral IV 09/10/23 Right Antecubital (Active)       Peripheral IV 09/10/23 Left Forearm (Active)        Opportunity for questions and clarification was provided.       Patient transported with:  Monitor  Joann Chisholm RN  09/10/23 9225

## 2023-09-10 NOTE — ED NOTES
TRANSFER - OUT REPORT:    Verbal report given to [address] on Netta Bridges  being transferred to [facility] for routine progression of patient care       Report consisted of patient's Situation, Background, Assessment and   Recommendations(SBAR). Information from the following report(s) Nurse Handoff Report, ED Encounter Summary, ED SBAR, [facility], Recent Results, and Cardiac Rhythm A-fib  was reviewed with the receiving nurse. [place] Fall Assessment:    Presents to emergency department  because of falls (Syncope, seizure, or loss of consciousness): No  Age > 70: Yes  Altered Mental Status, Intoxication with alcohol or substance confusion (Disorientation, impaired judgment, poor safety awaremess, or inability to follow instructions): No  Impaired Mobility: Ambulates or transfers with assistive devices or assistance; Unable to ambulate or transer.: Yes  Nursing Judgement: No          Lines:   Peripheral IV 09/10/23 Right Antecubital (Active)       Peripheral IV 09/10/23 Left Forearm (Active)        Opportunity for questions and clarification was provided.       Patient transported with:  Monitor  Joann Iniguez RN  09/10/23 2336

## 2023-09-11 ENCOUNTER — APPOINTMENT (OUTPATIENT)
Facility: HOSPITAL | Age: 88
DRG: 280 | End: 2023-09-11
Attending: HOSPITALIST
Payer: MEDICARE

## 2023-09-11 LAB
ANION GAP SERPL CALC-SCNC: 6 MMOL/L (ref 5–15)
BASOPHILS # BLD: 0 K/UL (ref 0–0.1)
BASOPHILS NFR BLD: 0 % (ref 0–1)
BUN SERPL-MCNC: 25 MG/DL (ref 6–20)
BUN/CREAT SERPL: 16 (ref 12–20)
CALCIUM SERPL-MCNC: 8.8 MG/DL (ref 8.5–10.1)
CHLORIDE SERPL-SCNC: 106 MMOL/L (ref 97–108)
CO2 SERPL-SCNC: 30 MMOL/L (ref 21–32)
CREAT SERPL-MCNC: 1.53 MG/DL (ref 0.7–1.3)
DIFFERENTIAL METHOD BLD: ABNORMAL
EOSINOPHIL # BLD: 0.1 K/UL (ref 0–0.4)
EOSINOPHIL NFR BLD: 1 % (ref 0–7)
ERYTHROCYTE [DISTWIDTH] IN BLOOD BY AUTOMATED COUNT: 14.6 % (ref 11.5–14.5)
GLUCOSE SERPL-MCNC: 90 MG/DL (ref 65–100)
HCT VFR BLD AUTO: 40.8 % (ref 36.6–50.3)
HGB BLD-MCNC: 13.3 G/DL (ref 12.1–17)
IMM GRANULOCYTES # BLD AUTO: 0 K/UL (ref 0–0.04)
IMM GRANULOCYTES NFR BLD AUTO: 0 % (ref 0–0.5)
INR PPP: 2 (ref 0.9–1.1)
LYMPHOCYTES # BLD: 1.8 K/UL (ref 0.8–3.5)
LYMPHOCYTES NFR BLD: 20 % (ref 12–49)
MAGNESIUM SERPL-MCNC: 1.8 MG/DL (ref 1.6–2.4)
MCH RBC QN AUTO: 29 PG (ref 26–34)
MCHC RBC AUTO-ENTMCNC: 32.6 G/DL (ref 30–36.5)
MCV RBC AUTO: 88.9 FL (ref 80–99)
MONOCYTES # BLD: 0.6 K/UL (ref 0–1)
MONOCYTES NFR BLD: 7 % (ref 5–13)
NEUTS SEG # BLD: 6.6 K/UL (ref 1.8–8)
NEUTS SEG NFR BLD: 72 % (ref 32–75)
NRBC # BLD: 0 K/UL (ref 0–0.01)
NRBC BLD-RTO: 0 PER 100 WBC
PLATELET # BLD AUTO: 240 K/UL (ref 150–400)
PMV BLD AUTO: 10.1 FL (ref 8.9–12.9)
POTASSIUM SERPL-SCNC: 3.2 MMOL/L (ref 3.5–5.1)
PROTHROMBIN TIME: 19.9 SEC (ref 9–11.1)
RBC # BLD AUTO: 4.59 M/UL (ref 4.1–5.7)
SODIUM SERPL-SCNC: 142 MMOL/L (ref 136–145)
UFH PPP CHRO-ACNC: 0.88 IU/ML
WBC # BLD AUTO: 9.1 K/UL (ref 4.1–11.1)

## 2023-09-11 PROCEDURE — 6360000002 HC RX W HCPCS: Performed by: SPECIALIST

## 2023-09-11 PROCEDURE — 2580000003 HC RX 258: Performed by: HOSPITALIST

## 2023-09-11 PROCEDURE — 94761 N-INVAS EAR/PLS OXIMETRY MLT: CPT

## 2023-09-11 PROCEDURE — 51798 US URINE CAPACITY MEASURE: CPT

## 2023-09-11 PROCEDURE — 2580000003 HC RX 258: Performed by: STUDENT IN AN ORGANIZED HEALTH CARE EDUCATION/TRAINING PROGRAM

## 2023-09-11 PROCEDURE — 6370000000 HC RX 637 (ALT 250 FOR IP): Performed by: HOSPITALIST

## 2023-09-11 PROCEDURE — 2000000000 HC ICU R&B

## 2023-09-11 PROCEDURE — 80048 BASIC METABOLIC PNL TOTAL CA: CPT

## 2023-09-11 PROCEDURE — 85520 HEPARIN ASSAY: CPT

## 2023-09-11 PROCEDURE — 83735 ASSAY OF MAGNESIUM: CPT

## 2023-09-11 PROCEDURE — 6370000000 HC RX 637 (ALT 250 FOR IP): Performed by: INTERNAL MEDICINE

## 2023-09-11 PROCEDURE — 94660 CPAP INITIATION&MGMT: CPT

## 2023-09-11 PROCEDURE — 85610 PROTHROMBIN TIME: CPT

## 2023-09-11 PROCEDURE — 6360000002 HC RX W HCPCS: Performed by: HOSPITALIST

## 2023-09-11 PROCEDURE — 85025 COMPLETE CBC W/AUTO DIFF WBC: CPT

## 2023-09-11 PROCEDURE — 6370000000 HC RX 637 (ALT 250 FOR IP): Performed by: SPECIALIST

## 2023-09-11 PROCEDURE — 36415 COLL VENOUS BLD VENIPUNCTURE: CPT

## 2023-09-11 PROCEDURE — 6360000002 HC RX W HCPCS: Performed by: STUDENT IN AN ORGANIZED HEALTH CARE EDUCATION/TRAINING PROGRAM

## 2023-09-11 PROCEDURE — 2700000000 HC OXYGEN THERAPY PER DAY

## 2023-09-11 RX ORDER — POTASSIUM CHLORIDE 750 MG/1
40 TABLET, FILM COATED, EXTENDED RELEASE ORAL ONCE
Status: COMPLETED | OUTPATIENT
Start: 2023-09-11 | End: 2023-09-11

## 2023-09-11 RX ORDER — OMEPRAZOLE 40 MG/1
40 CAPSULE, DELAYED RELEASE ORAL 2 TIMES DAILY
COMMUNITY

## 2023-09-11 RX ADMIN — HEPARIN SODIUM AND DEXTROSE 8 UNITS/KG/HR: 10000; 5 INJECTION INTRAVENOUS at 12:47

## 2023-09-11 RX ADMIN — METOPROLOL TARTRATE 25 MG: 25 TABLET, FILM COATED ORAL at 21:28

## 2023-09-11 RX ADMIN — SODIUM CHLORIDE: 9 INJECTION, SOLUTION INTRAVENOUS at 11:27

## 2023-09-11 RX ADMIN — ACETAMINOPHEN 650 MG: 325 TABLET ORAL at 23:39

## 2023-09-11 RX ADMIN — SODIUM CHLORIDE: 9 INJECTION, SOLUTION INTRAVENOUS at 00:39

## 2023-09-11 RX ADMIN — ACETAMINOPHEN 650 MG: 325 TABLET ORAL at 00:55

## 2023-09-11 RX ADMIN — POTASSIUM CHLORIDE 40 MEQ: 750 TABLET, FILM COATED, EXTENDED RELEASE ORAL at 11:14

## 2023-09-11 RX ADMIN — PANTOPRAZOLE SODIUM 40 MG: 40 TABLET, DELAYED RELEASE ORAL at 06:53

## 2023-09-11 RX ADMIN — ACETAMINOPHEN 650 MG: 325 TABLET ORAL at 08:22

## 2023-09-11 RX ADMIN — METOPROLOL TARTRATE 25 MG: 25 TABLET, FILM COATED ORAL at 08:23

## 2023-09-11 RX ADMIN — PHYTONADIONE 2.5 MG: 10 INJECTION, EMULSION INTRAMUSCULAR; INTRAVENOUS; SUBCUTANEOUS at 11:14

## 2023-09-11 RX ADMIN — SODIUM CHLORIDE, PRESERVATIVE FREE 10 ML: 5 INJECTION INTRAVENOUS at 21:28

## 2023-09-11 RX ADMIN — ACETAMINOPHEN 650 MG: 325 TABLET ORAL at 15:44

## 2023-09-11 RX ADMIN — SODIUM CHLORIDE: 9 INJECTION, SOLUTION INTRAVENOUS at 21:28

## 2023-09-11 RX ADMIN — ASPIRIN 81 MG: 81 TABLET, COATED ORAL at 08:23

## 2023-09-11 RX ADMIN — FUROSEMIDE 40 MG: 10 INJECTION, SOLUTION INTRAMUSCULAR; INTRAVENOUS at 08:23

## 2023-09-11 RX ADMIN — SODIUM CHLORIDE, PRESERVATIVE FREE 10 ML: 5 INJECTION INTRAVENOUS at 08:23

## 2023-09-11 ASSESSMENT — PAIN SCALES - GENERAL
PAINLEVEL_OUTOF10: 2
PAINLEVEL_OUTOF10: 0
PAINLEVEL_OUTOF10: 0
PAINLEVEL_OUTOF10: 2
PAINLEVEL_OUTOF10: 0
PAINLEVEL_OUTOF10: 4

## 2023-09-11 ASSESSMENT — PAIN DESCRIPTION - LOCATION
LOCATION: BACK

## 2023-09-11 ASSESSMENT — PAIN - FUNCTIONAL ASSESSMENT: PAIN_FUNCTIONAL_ASSESSMENT: ACTIVITIES ARE NOT PREVENTED

## 2023-09-11 ASSESSMENT — PAIN DESCRIPTION - ORIENTATION: ORIENTATION: POSTERIOR

## 2023-09-11 ASSESSMENT — PAIN DESCRIPTION - DESCRIPTORS
DESCRIPTORS: ACHING
DESCRIPTORS: ACHING

## 2023-09-11 NOTE — PROGRESS NOTES
Medication History Review by Pharmacist:    Comments/Recommendations:   Medication reconciliation completed by calling [facility] and reviewing all fill history with pharmacist.   The current list is all the medications that were picked up in the last 5 months from [facility]. Medications added:     omeprazole    Medications removed:    none    Medications adjusted:    none    Information obtained from: Rx query, [facility]    Allergies: Patient has no known allergies. Prior to Admission Medications:   No current facility-administered medications on file prior to encounter.      Current Outpatient Medications on File Prior to Encounter   Medication Sig Dispense Refill    omeprazole (PRILOSEC) 40 MG delayed release capsule Take 1 capsule by mouth in the morning and at bedtime      warfarin (JANTOVEN) 3 MG tablet Take 1 tablet by mouth daily      furosemide (LASIX) 20 MG tablet Take 1 tablet by mouth daily      sucralfate (CARAFATE) 1 GM tablet Take 1 tablet by mouth 2 times daily      oxybutynin (DITROPAN) 5 MG tablet Take 1 tablet by mouth 2 times daily        Thank you,  Jeffrey Alejandra, PharmD, BCPS  [phone]

## 2023-09-11 NOTE — CARE COORDINATION
9/11/2023  1:56 PM     09/11/23 1350   Service Assessment   Patient Orientation Alert and Oriented   Cognition Alert   History Provided By Child/Family  (daughter Carmen Bryant)   Primary [address] Staff  (pt has aide through [facility] at Home 2 Days/wk for 4 H)   Prior Functional Level Independent in ADLs/IADLs;Assistance with the following:;Housework; Shopping; Other (see comment)  (transportation)   Current Functional Level Assistance with the following:;Toileting   Can patient return to prior living arrangement Yes   Ability to make needs known: Good   Family able to assist with home care needs: Other (comment)  (pt currently  has aide through [facility] 2 Days/wk)   Would you like for me to discuss the discharge plan with any other family members/significant others, and if so, who? Yes  (daughter Carmen Bryant)   Financial Resources Medicare [facility] Medicare)   Social/Functional History   Lives With Alone   Type of Home Apartment   Home Layout One level   Home Access Level entry   Bathroom Shower/Tub None   [address] Help From Personal care attendant; Family   ADL Assistance Independent   Homemaking Assistance Needs assistance   Laundry Minimal   Cleaning Moderate   Driving Total   Shopping Minimal   Homemaking Responsibilities Yes   Meal Prep Responsibility Primary   Laundry Responsibility Primary   Cleaning Responsibility Primary   Bill Paying/Finance Responsibility Primary   Shopping Responsibility Primary   Health Care Management Primary   Ambulation Assistance Needs assistance  (RW for mobility)   Transfer Assistance Independent   Active  No   Patient's  Info Family or Aide   Mode of Transportation Car   Occupation Retired   [facility] Medications No  ([employer] Medicare, pt uses [facility] [place] no difficulty affording his medications)   Patient expects to be discharged to: Apartment   History of falls? 0   Services At/After Discharge   Mode of Transport at Discharge Self  (Family)   Confirm Follow Up Transport Family     Pt emergently admitted 9/10/23 for acute pulmonary edema, chest pain, followed by cardiology is continuing to require medical management for FLOR, Acute respiratory failure w/ hypoxia, currently requiring 3 L O2 NC, cardiology following, plan for Heart Cath 9/12   CM completed assessment w/ pt's daughter Sundeep Cosme via phone, charted demographics verified, pt lives alone in a 1st floor apt home w/ level entry. At baseline pt is ambulatory w/ RW, independent w/ his ADLS, manages his own medications. Pt is enrolled in [employer] at home program and has aide 2 days/wk  for 4 H rs to assist w/ homemaking and any ADLs, [facility], [place] is [employer] [phone].   No history of falls  PCP Ermelinda Seip, DO  Rx; [employer] pt uses [employer], is normally able to afford his medications  DME: RW, No Home O2  NO History of HH, SNF or IPR, [facility]able [facility] [address] [facility]    Family will provide transportation at DC  CM will follow for Home O2 needs if unable to wean   Skyler Maciel

## 2023-09-12 ENCOUNTER — APPOINTMENT (OUTPATIENT)
Facility: HOSPITAL | Age: 88
DRG: 280 | End: 2023-09-12
Attending: HOSPITALIST
Payer: MEDICARE

## 2023-09-12 LAB
ANION GAP SERPL CALC-SCNC: 4 MMOL/L (ref 5–15)
BUN SERPL-MCNC: 31 MG/DL (ref 6–20)
BUN/CREAT SERPL: 19 (ref 12–20)
CALCIUM SERPL-MCNC: 8.2 MG/DL (ref 8.5–10.1)
CHLORIDE SERPL-SCNC: 109 MMOL/L (ref 97–108)
CO2 SERPL-SCNC: 29 MMOL/L (ref 21–32)
CREAT SERPL-MCNC: 1.59 MG/DL (ref 0.7–1.3)
ECHO AR MAX VEL PISA: 3.4 M/S
ECHO AV AREA PEAK VELOCITY: 1.6 CM2
ECHO AV AREA VTI: 1.8 CM2
ECHO AV AREA/BSA PEAK VELOCITY: 0.8 CM2/M2
ECHO AV AREA/BSA VTI: 1 CM2/M2
ECHO AV MEAN GRADIENT: 5 MMHG
ECHO AV MEAN VELOCITY: 1 M/S
ECHO AV PEAK GRADIENT: 9 MMHG
ECHO AV PEAK VELOCITY: 1.5 M/S
ECHO AV REGURGITANT PHT: 610 MILLISECOND
ECHO AV VELOCITY RATIO: 0.53
ECHO AV VTI: 25 CM
ECHO BSA: 1.92 M2
ECHO BSA: 1.92 M2
ECHO LA DIAMETER INDEX: 2.17 CM/M2
ECHO LA DIAMETER: 4.1 CM
ECHO LA VOL 2C: 55 ML (ref 18–58)
ECHO LA VOL 2C: 56 ML (ref 18–58)
ECHO LA VOL 4C: 109 ML (ref 18–58)
ECHO LA VOL 4C: 118 ML (ref 18–58)
ECHO LA VOL BP: 95 ML (ref 18–58)
ECHO LA VOL/BSA BIPLANE: 50 ML/M2 (ref 16–34)
ECHO LA VOLUME AREA LENGTH: 101 ML
ECHO LA VOLUME INDEX AREA LENGTH: 53 ML/M2 (ref 16–34)
ECHO LV E' LATERAL VELOCITY: 10 CM/S
ECHO LV E' SEPTAL VELOCITY: 6 CM/S
ECHO LV EDV A2C: 58 ML
ECHO LV EDV A4C: 69 ML
ECHO LV EDV BP: 64 ML (ref 67–155)
ECHO LV EDV INDEX A4C: 37 ML/M2
ECHO LV EDV INDEX BP: 34 ML/M2
ECHO LV EDV NDEX A2C: 31 ML/M2
ECHO LV EF PHYSICIAN: 50 %
ECHO LV EJECTION FRACTION A2C: 54 %
ECHO LV EJECTION FRACTION A4C: 5 %
ECHO LV EJECTION FRACTION BIPLANE: 29 % (ref 55–100)
ECHO LV ESV A2C: 27 ML
ECHO LV ESV A4C: 66 ML
ECHO LV ESV BP: 46 ML (ref 22–58)
ECHO LV ESV INDEX A2C: 14 ML/M2
ECHO LV ESV INDEX A4C: 35 ML/M2
ECHO LV ESV INDEX BP: 24 ML/M2
ECHO LV FRACTIONAL SHORTENING: 32 % (ref 28–44)
ECHO LV INTERNAL DIMENSION DIASTOLE INDEX: 2.17 CM/M2
ECHO LV INTERNAL DIMENSION DIASTOLIC: 4.1 CM (ref 4.2–5.9)
ECHO LV INTERNAL DIMENSION SYSTOLIC INDEX: 1.48 CM/M2
ECHO LV INTERNAL DIMENSION SYSTOLIC: 2.8 CM
ECHO LV IVSD: 1.2 CM (ref 0.6–1)
ECHO LV MASS 2D: 171.7 G (ref 88–224)
ECHO LV MASS INDEX 2D: 90.9 G/M2 (ref 49–115)
ECHO LV POSTERIOR WALL DIASTOLIC: 1.2 CM (ref 0.6–1)
ECHO LV RELATIVE WALL THICKNESS RATIO: 0.59
ECHO LVOT AREA: 3.1 CM2
ECHO LVOT AV VTI INDEX: 0.56
ECHO LVOT DIAM: 2 CM
ECHO LVOT MEAN GRADIENT: 1 MMHG
ECHO LVOT PEAK GRADIENT: 2 MMHG
ECHO LVOT PEAK VELOCITY: 0.8 M/S
ECHO LVOT STROKE VOLUME INDEX: 23.4 ML/M2
ECHO LVOT SV: 44.3 ML
ECHO LVOT VTI: 14.1 CM
ECHO MV A VELOCITY: 0.25 M/S
ECHO MV AREA VTI: 1.3 CM2
ECHO MV E DECELERATION TIME (DT): 250.3 MS
ECHO MV E VELOCITY: 0.61 M/S
ECHO MV E/A RATIO: 2.44
ECHO MV E/E' LATERAL: 6.1
ECHO MV E/E' RATIO (AVERAGED): 8.13
ECHO MV E/E' SEPTAL: 10.17
ECHO MV LVOT VTI INDEX: 2.37
ECHO MV MAX VELOCITY: 0.9 M/S
ECHO MV MEAN GRADIENT: 1 MMHG
ECHO MV MEAN VELOCITY: 0.4 M/S
ECHO MV PEAK GRADIENT: 3 MMHG
ECHO MV REGURGITANT PEAK GRADIENT: 96 MMHG
ECHO MV REGURGITANT PEAK VELOCITY: 4.9 M/S
ECHO MV REGURGITANT VTIA: 169.8 CM
ECHO MV VTI: 33.4 CM
ECHO PULMONARY ARTERY SYSTOLIC PRESSURE (PASP): 41 MMHG
ECHO RV INTERNAL DIMENSION: 4.9 CM
ECHO TV REGURGITANT MAX VELOCITY: 2.82 M/S
ECHO TV REGURGITANT PEAK GRADIENT: 32 MMHG
ERYTHROCYTE [DISTWIDTH] IN BLOOD BY AUTOMATED COUNT: 14.6 % (ref 11.5–14.5)
GLUCOSE SERPL-MCNC: 76 MG/DL (ref 65–100)
HCT VFR BLD AUTO: 39.8 % (ref 36.6–50.3)
HGB BLD-MCNC: 12.9 G/DL (ref 12.1–17)
INR PPP: 1.5 (ref 0.9–1.1)
MCH RBC QN AUTO: 29.1 PG (ref 26–34)
MCHC RBC AUTO-ENTMCNC: 32.4 G/DL (ref 30–36.5)
MCV RBC AUTO: 89.8 FL (ref 80–99)
NRBC # BLD: 0 K/UL (ref 0–0.01)
NRBC BLD-RTO: 0 PER 100 WBC
PLATELET # BLD AUTO: 229 K/UL (ref 150–400)
PMV BLD AUTO: 10.1 FL (ref 8.9–12.9)
POTASSIUM SERPL-SCNC: 3.9 MMOL/L (ref 3.5–5.1)
PROTHROMBIN TIME: 15.3 SEC (ref 9–11.1)
RBC # BLD AUTO: 4.43 M/UL (ref 4.1–5.7)
SODIUM SERPL-SCNC: 142 MMOL/L (ref 136–145)
UFH PPP CHRO-ACNC: 0.35 IU/ML
WBC # BLD AUTO: 7.5 K/UL (ref 4.1–11.1)

## 2023-09-12 PROCEDURE — C1769 GUIDE WIRE: HCPCS | Performed by: SPECIALIST

## 2023-09-12 PROCEDURE — 1100000000 HC RM PRIVATE

## 2023-09-12 PROCEDURE — 2580000003 HC RX 258: Performed by: SPECIALIST

## 2023-09-12 PROCEDURE — 2500000003 HC RX 250 WO HCPCS: Performed by: SPECIALIST

## 2023-09-12 PROCEDURE — 94761 N-INVAS EAR/PLS OXIMETRY MLT: CPT

## 2023-09-12 PROCEDURE — 80048 BASIC METABOLIC PNL TOTAL CA: CPT

## 2023-09-12 PROCEDURE — B2111ZZ FLUOROSCOPY OF MULTIPLE CORONARY ARTERIES USING LOW OSMOLAR CONTRAST: ICD-10-PCS | Performed by: SPECIALIST

## 2023-09-12 PROCEDURE — 94660 CPAP INITIATION&MGMT: CPT

## 2023-09-12 PROCEDURE — 6370000000 HC RX 637 (ALT 250 FOR IP): Performed by: HOSPITALIST

## 2023-09-12 PROCEDURE — 85027 COMPLETE CBC AUTOMATED: CPT

## 2023-09-12 PROCEDURE — 2580000003 HC RX 258: Performed by: HOSPITALIST

## 2023-09-12 PROCEDURE — 4A023N7 MEASUREMENT OF CARDIAC SAMPLING AND PRESSURE, LEFT HEART, PERCUTANEOUS APPROACH: ICD-10-PCS | Performed by: SPECIALIST

## 2023-09-12 PROCEDURE — 6360000002 HC RX W HCPCS: Performed by: SPECIALIST

## 2023-09-12 PROCEDURE — 85610 PROTHROMBIN TIME: CPT

## 2023-09-12 PROCEDURE — 6360000004 HC RX CONTRAST MEDICATION: Performed by: SPECIALIST

## 2023-09-12 PROCEDURE — 2700000000 HC OXYGEN THERAPY PER DAY

## 2023-09-12 PROCEDURE — 93306 TTE W/DOPPLER COMPLETE: CPT

## 2023-09-12 PROCEDURE — 6370000000 HC RX 637 (ALT 250 FOR IP): Performed by: SPECIALIST

## 2023-09-12 PROCEDURE — 93458 L HRT ARTERY/VENTRICLE ANGIO: CPT | Performed by: SPECIALIST

## 2023-09-12 PROCEDURE — 99153 MOD SED SAME PHYS/QHP EA: CPT | Performed by: SPECIALIST

## 2023-09-12 PROCEDURE — 99152 MOD SED SAME PHYS/QHP 5/>YRS: CPT | Performed by: SPECIALIST

## 2023-09-12 PROCEDURE — 36415 COLL VENOUS BLD VENIPUNCTURE: CPT

## 2023-09-12 PROCEDURE — 2709999900 HC NON-CHARGEABLE SUPPLY: Performed by: SPECIALIST

## 2023-09-12 PROCEDURE — 85520 HEPARIN ASSAY: CPT

## 2023-09-12 PROCEDURE — 6370000000 HC RX 637 (ALT 250 FOR IP): Performed by: INTERNAL MEDICINE

## 2023-09-12 PROCEDURE — C1894 INTRO/SHEATH, NON-LASER: HCPCS | Performed by: SPECIALIST

## 2023-09-12 RX ORDER — ACETAMINOPHEN 325 MG/1
650 TABLET ORAL EVERY 4 HOURS PRN
Status: DISCONTINUED | OUTPATIENT
Start: 2023-09-12 | End: 2023-09-14 | Stop reason: HOSPADM

## 2023-09-12 RX ORDER — LISINOPRIL 5 MG/1
10 TABLET ORAL DAILY
Status: DISCONTINUED | OUTPATIENT
Start: 2023-09-12 | End: 2023-09-12

## 2023-09-12 RX ORDER — MIDAZOLAM HYDROCHLORIDE 1 MG/ML
INJECTION INTRAMUSCULAR; INTRAVENOUS PRN
Status: DISCONTINUED | OUTPATIENT
Start: 2023-09-12 | End: 2023-09-12 | Stop reason: HOSPADM

## 2023-09-12 RX ORDER — AMLODIPINE BESYLATE 5 MG/1
5 TABLET ORAL DAILY
Status: DISCONTINUED | OUTPATIENT
Start: 2023-09-12 | End: 2023-09-13

## 2023-09-12 RX ORDER — SODIUM CHLORIDE 0.9 % (FLUSH) 0.9 %
5-40 SYRINGE (ML) INJECTION EVERY 12 HOURS SCHEDULED
Status: DISCONTINUED | OUTPATIENT
Start: 2023-09-12 | End: 2023-09-14 | Stop reason: HOSPADM

## 2023-09-12 RX ORDER — WARFARIN SODIUM 1 MG/1
3 TABLET ORAL
Status: DISPENSED | OUTPATIENT
Start: 2023-09-12 | End: 2023-09-13

## 2023-09-12 RX ORDER — ROSUVASTATIN CALCIUM 10 MG/1
20 TABLET, COATED ORAL DAILY
Status: DISCONTINUED | OUTPATIENT
Start: 2023-09-12 | End: 2023-09-14 | Stop reason: HOSPADM

## 2023-09-12 RX ORDER — SODIUM CHLORIDE 0.9 % (FLUSH) 0.9 %
5-40 SYRINGE (ML) INJECTION PRN
Status: DISCONTINUED | OUTPATIENT
Start: 2023-09-12 | End: 2023-09-14 | Stop reason: HOSPADM

## 2023-09-12 RX ORDER — FENTANYL CITRATE 50 UG/ML
INJECTION, SOLUTION INTRAMUSCULAR; INTRAVENOUS PRN
Status: DISCONTINUED | OUTPATIENT
Start: 2023-09-12 | End: 2023-09-12 | Stop reason: HOSPADM

## 2023-09-12 RX ORDER — BUMETANIDE 1 MG/1
1 TABLET ORAL ONCE
Status: COMPLETED | OUTPATIENT
Start: 2023-09-12 | End: 2023-09-12

## 2023-09-12 RX ORDER — SODIUM CHLORIDE 9 MG/ML
INJECTION, SOLUTION INTRAVENOUS PRN
Status: DISCONTINUED | OUTPATIENT
Start: 2023-09-12 | End: 2023-09-14 | Stop reason: HOSPADM

## 2023-09-12 RX ADMIN — ROSUVASTATIN CALCIUM 20 MG: 10 TABLET, COATED ORAL at 13:07

## 2023-09-12 RX ADMIN — METOPROLOL TARTRATE 25 MG: 25 TABLET, FILM COATED ORAL at 09:02

## 2023-09-12 RX ADMIN — PANTOPRAZOLE SODIUM 40 MG: 40 TABLET, DELAYED RELEASE ORAL at 09:02

## 2023-09-12 RX ADMIN — AMLODIPINE BESYLATE 5 MG: 5 TABLET ORAL at 15:29

## 2023-09-12 RX ADMIN — SODIUM CHLORIDE, PRESERVATIVE FREE 10 ML: 5 INJECTION INTRAVENOUS at 20:52

## 2023-09-12 RX ADMIN — METOPROLOL TARTRATE 25 MG: 25 TABLET, FILM COATED ORAL at 20:50

## 2023-09-12 RX ADMIN — ACETAMINOPHEN 650 MG: 325 TABLET ORAL at 18:58

## 2023-09-12 RX ADMIN — BUMETANIDE 1 MG: 1 TABLET ORAL at 14:42

## 2023-09-12 RX ADMIN — ASPIRIN 81 MG: 81 TABLET, COATED ORAL at 09:02

## 2023-09-12 RX ADMIN — SODIUM CHLORIDE, PRESERVATIVE FREE 10 ML: 5 INJECTION INTRAVENOUS at 09:11

## 2023-09-12 ASSESSMENT — PAIN SCALES - GENERAL
PAINLEVEL_OUTOF10: 3
PAINLEVEL_OUTOF10: 0
PAINLEVEL_OUTOF10: 0

## 2023-09-12 ASSESSMENT — PAIN DESCRIPTION - DESCRIPTORS: DESCRIPTORS: ACHING;OTHER (COMMENT)

## 2023-09-12 ASSESSMENT — PAIN DESCRIPTION - LOCATION: LOCATION: KNEE

## 2023-09-12 ASSESSMENT — PAIN DESCRIPTION - ORIENTATION: ORIENTATION: RIGHT;LEFT

## 2023-09-12 NOTE — BRIEF OP NOTE
Cath:  Obstructive 1VD:     LM p40     LAD ost-prox 95; D2 99     Ost50, p40; OM1 ost90 (small)     RCA p30, m30, d30  No AVG  Patent L SC  RFA manual    Options:  Med mgmt (continue outpt meds, beta-blocker, add plavix to coumadin, +/- NTG). HIGH-risk PCI of osteal-prox LAD  1V CABG (LIMA-LAD)    Spoke with Pt, daughter Avni Matthew ([phone]), Marta's . Given advanced age, abn renal function, HEAVILY calcified vessels, and high-risk invasive options, we are pursuing #1. Will re-discuss tomorrow to solidify this decision. Will resume coumadin, cont BB, start plavix.

## 2023-09-12 NOTE — CARE COORDINATION
09/12/23 2284   Condition of Participation: Discharge Planning   The Plan for Transition of Care is related to the following treatment goals: discharge needs   The Patient and/or Patient Representative was provided with a Choice of Provider? Patient   The Patient and/Or Patient Representative agree with the Discharge Plan? Yes  [facility]   Freedom of Choice list was provided with basic dialogue that supports the patient's individualized plan of care/goals, treatment preferences, and shares the quality data associated with the providers?   Yes     Meg Koenig

## 2023-09-12 NOTE — PROGRESS NOTES
[address] notified Razia Lazcano about possible hematoma to R groin. Dr. uJlissa Gr and Dr. Chas Stone notified by [facility] RN. Orders to apply manual pressure for 10 min, then apply a femstop for 1 hour. Orders to hold heparin and warfarin at this time. Karoline Becker RN.

## 2023-09-12 NOTE — CARE COORDINATION
Transition of Care Plan:    RUR: 11%  Prior Level of Functioning: personal care aides through [facility] two days/week for 4 hours ,requires assistance with toileting,shopping,housewok(prior to admission)  Disposition: hopeful return home with personal care services    Prior to hospitalization:  Pt lived alone in a one level apartment with a level entry. Pt ambulates with a rolling walker @ home. He manages taking his own home medications independently,  Pharmacy of choice;[facility] [place]  If home health is prescribed,first choice is [address]. Care coordinator,Anna called to leave her information in case needed for discharge planning. Anna's number is [phone]. Pallavi Jane is the [facility], [place] which is through [facility]. Today:  Cardiac cath -obstructive 1VD  Current plan is for medical management (adding plavix)per cardiology. I met with pt briefly before he received a phone call. First choice of home health,if needed ,would be [facility]. I contacted [facility]  who can cover up to 11 hours /day. I obtained PT/OT orders. In discussing pt with the [facility] coordinator,if SNF is needed,pt can go to SNF @ [facility] if pt is agreeable. Once therapy works with pt,I will know more as to what pt will need and will discuss with pt,attending and [facility].     Simin Johnson

## 2023-09-12 NOTE — PROGRESS NOTES
Hospitalist Progress Note      NAME:  Jovani Colon   :  1935  MRM:  [id number]    Date/Time: 2023  2:11 PM           Assessment / Plan:     Given the patient's current clinical presentation, I have a high level of concern for decompensation if discharged from the emergency department. Complex decision making was performed, which includes reviewing the patient's available past medical records, laboratory results, and x-ray films. My assessment of this patient's clinical condition and my plan of care is as follows. Assessment / Plan:     Acute hypoxic respiratory failure requiring BiPAP due to pulmonary edema   NSTEMI type 1, trops 18K  - can transfer out of icu at this point, remote tele  - discussed w cardiology, based on cath, opted for medical management rather than stenting high risk osteal-prox LAD. - asa, metoprolol bid, crestor. ADD norvasc, until tomorrow, if creat stable, add lisinopril. Give a dose of bumex. EF relatively preserved at 50%  - no room air, tirate meds as tolerated. Transfer out of unit     FLOR versus CKD with creatinine of 1.67  -- diurese iv lasix qd; improving     Persistent A-fib  - got K for cath, now titrating back up on warfarin for goal 2-3     Overactive bladder with urinary incontinence - condom cath bladder scan q6h     Medical Decision Making:   I have personally reviewed the radiographs, laboratory data in Epic and decisions and statements above are based partially on this personal interpretation. Code Status: Full Code  DVT Prophylaxis: Coumadin  GI Prophylaxis: PPI   Dsipo: transfer out of unit, increase meds as tolerated    Attending Physician: Luis Coffman DO        Subjective:     Chief Complaint:  shortness of breath    No cp sob nvd  [facility] bedside      ROS:  (bold if positive, if negative)    Tolerating Diet          Objective:       Vitals:          Last 24hrs VS reviewed since prior progress note.  Most recent

## 2023-09-12 NOTE — PROGRESS NOTES
[facility] Dosing Services: 9/12/2023    Consult for Warfarin Dosing by Pharmacy by Dr. Asuncion Turner provided for this 81 y.o. Male , for indication of Afib    Day of Therapy - Continuation from home  Home dose - 3 mg daily (this has worked well per patient, last dose at home was on 9/9/23, INR on admission was 2.5)  Dose to achieve an INR goal of 2-3    Significant drug interactions: 2.5 mg oral Vitamin K x2 (9/10 & 9/11), Heparin drip    PT/INR Lab Results   Component Value Date/Time    INR 1.5 09/12/2023 04:50 AM      Platelets Lab Results   Component Value Date/Time     09/12/2023 04:50 AM      H/H Lab Results   Component Value Date/Time    HGB 12.9 09/12/2023 04:50 AM            A/P:  Will order 3 mg Warfarin dose today (same as home dose)  Vitamin K given on 9/10 & 9/11 may provide resistance to Warfarin. Currently bridging with Heparin drip           Pharmacy to follow daily and will provide subsequent Warfarin dosing based on clinical status.   [address], [address]) Contact information [phone]

## 2023-09-13 LAB
ANION GAP SERPL CALC-SCNC: 6 MMOL/L (ref 5–15)
BUN SERPL-MCNC: 27 MG/DL (ref 6–20)
BUN/CREAT SERPL: 19 (ref 12–20)
CALCIUM SERPL-MCNC: 8.2 MG/DL (ref 8.5–10.1)
CHLORIDE SERPL-SCNC: 106 MMOL/L (ref 97–108)
CO2 SERPL-SCNC: 30 MMOL/L (ref 21–32)
CREAT SERPL-MCNC: 1.39 MG/DL (ref 0.7–1.3)
ERYTHROCYTE [DISTWIDTH] IN BLOOD BY AUTOMATED COUNT: 14.6 % (ref 11.5–14.5)
GLUCOSE SERPL-MCNC: 86 MG/DL (ref 65–100)
HCT VFR BLD AUTO: 40.9 % (ref 36.6–50.3)
HGB BLD-MCNC: 13.4 G/DL (ref 12.1–17)
INR PPP: 1.2 (ref 0.9–1.1)
MCH RBC QN AUTO: 29.3 PG (ref 26–34)
MCHC RBC AUTO-ENTMCNC: 32.8 G/DL (ref 30–36.5)
MCV RBC AUTO: 89.3 FL (ref 80–99)
NRBC # BLD: 0 K/UL (ref 0–0.01)
NRBC BLD-RTO: 0 PER 100 WBC
PLATELET # BLD AUTO: 232 K/UL (ref 150–400)
PMV BLD AUTO: 10.3 FL (ref 8.9–12.9)
POTASSIUM SERPL-SCNC: 3.6 MMOL/L (ref 3.5–5.1)
PROTHROMBIN TIME: 12.7 SEC (ref 9–11.1)
RBC # BLD AUTO: 4.58 M/UL (ref 4.1–5.7)
SODIUM SERPL-SCNC: 142 MMOL/L (ref 136–145)
UFH PPP CHRO-ACNC: <0.1 IU/ML
UFH PPP CHRO-ACNC: <0.1 IU/ML
WBC # BLD AUTO: 8.9 K/UL (ref 4.1–11.1)

## 2023-09-13 PROCEDURE — 1100000000 HC RM PRIVATE

## 2023-09-13 PROCEDURE — 97165 OT EVAL LOW COMPLEX 30 MIN: CPT

## 2023-09-13 PROCEDURE — 85520 HEPARIN ASSAY: CPT

## 2023-09-13 PROCEDURE — 94761 N-INVAS EAR/PLS OXIMETRY MLT: CPT

## 2023-09-13 PROCEDURE — 97530 THERAPEUTIC ACTIVITIES: CPT

## 2023-09-13 PROCEDURE — 2580000003 HC RX 258: Performed by: SPECIALIST

## 2023-09-13 PROCEDURE — 80048 BASIC METABOLIC PNL TOTAL CA: CPT

## 2023-09-13 PROCEDURE — 2580000003 HC RX 258: Performed by: HOSPITALIST

## 2023-09-13 PROCEDURE — 97116 GAIT TRAINING THERAPY: CPT

## 2023-09-13 PROCEDURE — 6370000000 HC RX 637 (ALT 250 FOR IP): Performed by: HOSPITALIST

## 2023-09-13 PROCEDURE — 6370000000 HC RX 637 (ALT 250 FOR IP): Performed by: SPECIALIST

## 2023-09-13 PROCEDURE — 97162 PT EVAL MOD COMPLEX 30 MIN: CPT

## 2023-09-13 PROCEDURE — 36415 COLL VENOUS BLD VENIPUNCTURE: CPT

## 2023-09-13 PROCEDURE — 85610 PROTHROMBIN TIME: CPT

## 2023-09-13 PROCEDURE — 85027 COMPLETE CBC AUTOMATED: CPT

## 2023-09-13 PROCEDURE — 6370000000 HC RX 637 (ALT 250 FOR IP): Performed by: INTERNAL MEDICINE

## 2023-09-13 RX ORDER — WARFARIN SODIUM 1 MG/1
3 TABLET ORAL
Status: COMPLETED | OUTPATIENT
Start: 2023-09-13 | End: 2023-09-13

## 2023-09-13 RX ORDER — LISINOPRIL 5 MG/1
10 TABLET ORAL DAILY
Status: DISCONTINUED | OUTPATIENT
Start: 2023-09-13 | End: 2023-09-14

## 2023-09-13 RX ORDER — CLOPIDOGREL BISULFATE 75 MG/1
75 TABLET ORAL DAILY
Status: DISCONTINUED | OUTPATIENT
Start: 2023-09-13 | End: 2023-09-14 | Stop reason: HOSPADM

## 2023-09-13 RX ADMIN — SODIUM CHLORIDE, PRESERVATIVE FREE 10 ML: 5 INJECTION INTRAVENOUS at 08:59

## 2023-09-13 RX ADMIN — CLOPIDOGREL BISULFATE 75 MG: 75 TABLET ORAL at 12:35

## 2023-09-13 RX ADMIN — ASPIRIN 81 MG: 81 TABLET, COATED ORAL at 08:58

## 2023-09-13 RX ADMIN — ROSUVASTATIN CALCIUM 20 MG: 10 TABLET, COATED ORAL at 08:58

## 2023-09-13 RX ADMIN — SODIUM CHLORIDE, PRESERVATIVE FREE 5 ML: 5 INJECTION INTRAVENOUS at 20:48

## 2023-09-13 RX ADMIN — METOPROLOL TARTRATE 25 MG: 25 TABLET, FILM COATED ORAL at 20:48

## 2023-09-13 RX ADMIN — ACETAMINOPHEN 650 MG: 325 TABLET ORAL at 06:16

## 2023-09-13 RX ADMIN — WARFARIN SODIUM 3 MG: 1 TABLET ORAL at 17:51

## 2023-09-13 RX ADMIN — METOPROLOL TARTRATE 25 MG: 25 TABLET, FILM COATED ORAL at 08:59

## 2023-09-13 RX ADMIN — AMLODIPINE BESYLATE 5 MG: 5 TABLET ORAL at 08:59

## 2023-09-13 RX ADMIN — PANTOPRAZOLE SODIUM 40 MG: 40 TABLET, DELAYED RELEASE ORAL at 06:16

## 2023-09-13 RX ADMIN — ACETAMINOPHEN 650 MG: 325 TABLET ORAL at 15:12

## 2023-09-13 ASSESSMENT — PAIN DESCRIPTION - ORIENTATION: ORIENTATION: MID

## 2023-09-13 ASSESSMENT — PAIN SCALES - GENERAL
PAINLEVEL_OUTOF10: 3
PAINLEVEL_OUTOF10: 6
PAINLEVEL_OUTOF10: 1

## 2023-09-13 ASSESSMENT — PAIN DESCRIPTION - DESCRIPTORS: DESCRIPTORS: ACHING;SORE

## 2023-09-13 ASSESSMENT — PAIN DESCRIPTION - LOCATION: LOCATION: BACK

## 2023-09-13 NOTE — PLAN OF CARE
Problem: Occupational Therapy - Adult  Goal: By Discharge: Performs self-care activities at highest level of function for planned discharge setting. See evaluation for individualized goals. Description: FUNCTIONAL STATUS PRIOR TO ADMISSION:  Patient is primarily MI for self care and functional transfers/mobility with RW. HOME SUPPORT: caregiver that comes 2x a week for 3 hours at a time and daughters close by    Occupational Therapy Goals:  Initiated 9/13/2023  1. Patient will perform grooming with Modified [place] within 7 day(s). 2.  Patient will perform upper body dressing with [place] within 7 day(s). 3.  Patient will perform lower body dressing with Modified [place] within 7 day(s). 4.  Patient will perform toilet transfers with Modified [place]  within 7 day(s). 5.  Patient will perform all aspects of toileting with Modified [place] within 7 day(s). 6.  Patient will participate in upper extremity therapeutic exercise/activities with Modified [place] for 10 minutes within 7 day(s). 7.  Patient will utilize energy conservation techniques during functional activities with verbal cues within 7 day(s). Outcome: Progressing     OCCUPATIONAL THERAPY EVALUATION    Patient: Elodia John (19 y.o. male)  Date: 9/13/2023  Primary Diagnosis: Acute pulmonary edema (HCC) [J81.0]  Elevated troponin [R77.8]  FLOR (acute kidney injury) ([address]) [N17.9]  Acute respiratory failure with hypoxia (HCC) [J96.01]  Acute respiratory failure with hypoxia and hypercapnia (HCC) [J96.01, J96.02]  Procedure(s) (LRB):  Left heart cath / coronary angiography (N/A) 1 Day Post-Op     Precautions: Fall Risk                  ASSESSMENT :  Patient received semi supine in bed A&OX4 and agreeable for OT eval/tx. Per pt report, pt lives in an apartment with walk in entrance and is MI for self care (use of shower chair with back) and functional transfers/mobility with RW.      Patient presents with decreased entry      Bathroom Shower/Tub: None     Bathroom Equipment: None     Home Equipment: Walker, rolling (transport chair)     EXAMINATION OF PERFORMANCE DEFICITS:    Cognitive/Behavioral Status:  Orientation  Overall Orientation Status: Within Normal Limits  Orientation Level: Oriented X4  Cognition  Overall Cognitive Status: WNL    Range of Motion:   AROM: Within functional limits  PROM: Within functional limits    Strength:  Strength: Generally decreased, functional    Coordination:      Coordination: Generally decreased, functional      Functional Mobility and Transfers for ADLs:    Bed Mobility:     Bed Mobility Training  Bed Mobility Training: Yes  Overall Level of Assistance: Modified independent  Rolling: Modified independent  Supine to Sit: Modified independent  Sit to Supine: Modified independent  Scooting: Independent    Transfers:     Transfer Training  Transfer Training: Yes  Overall Level of Assistance: Stand-by assistance  Sit to Stand: Stand-by assistance  Stand to Sit: Stand-by assistance  Bed to Chair: Stand-by assistance  Toilet Transfer: Stand-by assistance     Balance:  Standing: Impaired  Balance  Sitting: Intact  Standing: Impaired  Standing - Static: Constant support;Good  Standing - Dynamic: Constant support;Good;Fair    ADL Assessment:     Grooming: Stand by assistance (standing sink oral/facial/hand hygiene)     LE Dressing: Stand by assistance (doff/adilson socks)     Toileting: Stand by assistance (simulated)     ADL Intervention and task modifications:      [facility] AM-PACTM \"6 Clicks\"                                                       Daily Activity Inpatient Short Form  How much help from another person does the patient currently need. .. Total; A Lot A Little None   1. Putting on and taking off regular lower body clothing? []  1 []  2 [x]  3 []  4   2. Bathing (including washing, rinsing, drying)? []  1 []  2 [x]  3 []  4   3.   Toileting, which includes using toilet, bedpan or

## 2023-09-13 NOTE — PROGRESS NOTES
Hospitalist Progress Note      NAME:  Stephane Steward   :  1935  MRM:  [id number]    Date/Time: 2023  6:16 PM           Assessment / Plan:     Given the patient's current clinical presentation, I have a high level of concern for decompensation if discharged from the emergency department. Complex decision making was performed, which includes reviewing the patient's available past medical records, laboratory results, and x-ray films. My assessment of this patient's clinical condition and my plan of care is as follows. Assessment / Plan:     Acute hypoxic respiratory failure requiring BiPAP due to pulmonary edema   NSTEMI type 1, trops 18K  - can transfer out of icu at this point, remote tele  - discussed w cardiology, based on cath, opted for medical management rather than stenting high risk osteal-prox LAD. - asa, metoprolol bid, crestor, add lisinopril in exchange for norvasc  - plavix added to warfarin, ok to resume warfarin d/w pharmacy and cards     FLOR versus CKD with creatinine of 1.67  -- diurese prn, resolving     Persistent A-fib  - got K for cath, now titrating back up on warfarin for goal 2-3  - per cards, ok to dc prior to achieving goal INR, no need to bridge     Overactive bladder with urinary incontinence - condom cath bladder scan q6h     Medical Decision Making:   I have personally reviewed the radiographs, laboratory data in Epic and decisions and statements above are based partially on this personal interpretation. Code Status: Full Code  DVT Prophylaxis: Coumadin  GI Prophylaxis: PPI   Dsipo: likely dc tomorrow as able    Attending Physician: Landy Medley DO        Subjective:     Chief Complaint:  shortness of breath    No cp sob nvd  Pleasant  Sitting in chair, talking about dinner  Hematoma stopped      ROS:  (bold if positive, if negative)    Tolerating Diet          Objective:       Vitals:          Last 24hrs VS reviewed since prior progress note.  Most recent infusion   IntraVENous PRN    ondansetron (ZOFRAN-ODT) disintegrating tablet 4 mg  4 mg Oral Q8H PRN    Or    ondansetron (ZOFRAN) injection 4 mg  4 mg IntraVENous Q6H PRN    polyethylene glycol (GLYCOLAX) packet 17 g  17 g Oral Daily PRN    acetaminophen (TYLENOL) tablet 650 mg  650 mg Oral Q6H PRN    Or    acetaminophen (TYLENOL) suppository 650 mg  650 mg Rectal Q6H PRN    pantoprazole (PROTONIX) tablet 40 mg  40 mg Oral QAM AC    metoprolol tartrate (LOPRESSOR) tablet 25 mg  25 mg Oral BID    nitroGLYCERIN (NITROSTAT) SL tablet 0.4 mg  0.4 mg SubLINGual Q5 Min PRN            Lab Review:     Recent Labs     09/11/23 0441 09/12/23 0450 09/13/23  0926   WBC 9.1 7.5 8.9   HGB 13.3 12.9 13.4   HCT 40.8 39.8 40.9    229 232     Recent Labs     09/11/23 0441 09/11/23  0834 09/12/23 0450 09/13/23  0426     --  142 142   K 3.2*  --  3.9 3.6     --  109* 106   CO2 30  --  29 30   BUN 25*  --  31* 27*   MG 1.8  --   --   --    INR  --  2.0* 1.5* 1.2*     No components found for: \"GLPOC\"

## 2023-09-13 NOTE — PLAN OF CARE
Problem: Discharge Planning  Goal: Discharge to home or other facility with appropriate resources  Outcome: Progressing     Problem: Safety - Adult  Goal: Free from fall injury  Outcome: Progressing     Problem: Respiratory - Adult  Goal: Achieves optimal ventilation and oxygenation  Outcome: Progressing     Problem: Pain  Goal: Verbalizes/displays adequate comfort level or baseline comfort level  Outcome: Progressing     Problem: Skin/Tissue Integrity  Goal: Absence of new skin breakdown  Description: 1. Monitor for areas of redness and/or skin breakdown  2. Assess vascular access sites hourly  3. Every 4-6 hours minimum:  Change oxygen saturation probe site  4. Every 4-6 hours:  If on nasal continuous positive airway pressure, respiratory therapy assess nares and determine need for appliance change or resting period.   Outcome: Progressing     Problem: ABCDS Injury Assessment  Goal: Absence of physical injury  Outcome: Progressing

## 2023-09-13 NOTE — CARDIO/PULMONARY
[facility] Cardiac Rehab  Chart review completed. Acute hypoxic respiratory failure- NSTEMI Type I. Cath results of CAD-medical management. Following patient- who requires assistance with toileting and the need for discharge home health vs SNF. CAD will be medical management. Pt would not be a candidate for outpatient cardiac rehab at this time due to [address] vs SNF, as well as incontinence status. N.  [employer]

## 2023-09-13 NOTE — CARE COORDINATION
9/13/2023  1:40 PM  Care Management Progress Note      ICD-10-CM    1. Acute respiratory failure with hypoxia and hypercapnia (HCC)  J96.01     J96.02       2. Acute pulmonary edema (HCC)  J81.0       3. FLOR (acute kidney injury) ([address])  N17.9       4. Elevated troponin  R77.8       5. Acute respiratory failure with hypoxia (HCC)  J96.01 Transthoracic echocardiogram (TTE) complete with contrast, bubble, strain, and 3D PRN     Transthoracic echocardiogram (TTE) complete with contrast, bubble, strain, and 3D PRN      6. Chest pain  R07.9 Cardiac procedure     Cardiac procedure          RUR:  13%  Risk Level: [x]Low []Moderate []High  Value-based purchasing: [] Yes [x] No  Bundle patient: [] Yes [x] No   Specify:     Transition of care plan:  Awaiting medical clearance and DC order. PT/OT treating. Cards following. Return to [address] [place] [address] with personal care as needed. CM attempted p/c to care coordinator, [facility], to discuss therapy's [address] [place] [address],[address] recommendation. Awaiting a returned p/c. Outpatient follow-up. Transport need TBD.

## 2023-09-13 NOTE — DISCHARGE INSTRUCTIONS
Discharge Instructions:     Medications: Activity:     As tolerated except do not lift over 10 pounds for 5 days. Diet:     American Heart Association. Follow-up:     Follow up with Duane Chamorro MD on 2023 at 4:30pm.  [address] [place] [address]  [phone]      If you smoke, STOP! HOSPITALIST DISCHARGE INSTRUCTIONS  NAME:  Rohan Owens   :  1935   MRN:  [id number]     Date/Time:  2023 12:17 PM    ADMIT DATE: 9/10/2023     DISCHARGE DATE: 2023     DISCHARGE DIAGNOSIS:  Atrial fibrillation   Coronary artery disease     DISCHARGE INSTRUCTIONS:  Thank you for allowing us to participate in your care. Your discharging Hospitalist is Kelechi Carbajal MD. Cecile Barrett were admitted for evaluation and treatment of the above. MEDICATIONS:    It is important that you take the medication exactly as they are prescribed. Keep your medication in the bottles provided by the pharmacist and keep a list of the medication names, dosages, and times to be taken in your wallet. Do not take other medications without consulting your doctor. If you experience any of the following symptoms then please call your primary care physician or return to the emergency room if you cannot get hold of your doctor:  Fever, chills, nausea, vomiting, diarrhea, change in mentation, falling, bleeding, shortness of breath    Follow Up:  Please call the below provider to arrange hospital follow up appointment      Duane Chamorro MD   [facility]  [phone]    Follow up on 2023  4:30pm      For questions regarding your Hospitalization or to contact the [address] [place] [address] team, please call [phone]. Information obtained by :  I understand that if any problems occur once I am at home I am to contact my physician. I understand and acknowledge receipt of the instructions indicated above.

## 2023-09-13 NOTE — PLAN OF CARE
Problem: Physical Therapy - Adult  Goal: By Discharge: Performs mobility at highest level of function for planned discharge setting. See evaluation for individualized goals. Description: FUNCTIONAL STATUS PRIOR TO ADMISSION: Patient was modified independent using a rolling walker for functional mobility. HOME SUPPORT PRIOR TO ADMISSION: The patient lived alone with twice weekly caregiver to provide assistance with showering and grocery shopping. Physical Therapy Goals  Initiated 9/13/2023  1. Patient will move from supine to sit and sit to supine, scoot up and down, and roll side to side in bed with independence within 7 day(s). 2.  Patient will perform sit to stand with modified independence within 7 day(s). 3.  Patient will transfer from bed to chair and chair to bed with modified independence using the least restrictive device within 7 day(s). 4.  Patient will ambulate with modified independence for 200 feet with the least restrictive device within 7 day(s). Outcome: Progressing   PHYSICAL THERAPY EVALUATION    Patient: Diana Sanchez (22 y.o. male)  Date: 9/13/2023  Primary Diagnosis: Acute pulmonary edema (HCC) [J81.0]  Elevated troponin [R77.8]  FLOR (acute kidney injury) ([address]) [N17.9]  Acute respiratory failure with hypoxia (HCC) [J96.01]  Acute respiratory failure with hypoxia and hypercapnia (HCC) [J96.01, J96.02]  Procedure(s) (LRB):  Left heart cath / coronary angiography (N/A) 1 Day Post-Op   Precautions: Fall Risk                    ASSESSMENT :   DEFICITS/IMPAIRMENTS:   The patient is limited by decreased functional mobility, independence in ADLs, strength, activity tolerance in the setting of hospital admission for NSTEMI, subsequent L heart cath, FLOR and acute pulmonary edema. Patient today agreeable to PT evaluation, but reports feeling though he does not need PT services.  He tolerates in room ambulation with SBA and RW and has one mild loss of balance which he recovers Acute Care Hospitalization in Select Patient Groups: A Retrospective, Observational Study. Arch Rehabil Res Clin Transl. 2022;4(3):035458. doi: 10.1016/j.arrct. 2303.721080. PMID: 30021253; PMCID: YUX7721483. 4. Araceli Rodriguez Ni P. AM-PAC Short Forms Manual 4.0. Revised 2020. Pain Ratin/10   Pain Intervention(s): Activity Tolerance:   Good and tolerates ADLS without rest breaks    After treatment:   Patient left in no apparent distress sitting up in chair and Call bell within reach    COMMUNICATION/EDUCATION:   The patient's plan of care was discussed with: occupational therapist, registered nurse, and     Patient Education  Education Given To: Patient  Education Provided: Role of Therapy; Equipment;Transfer Training  Education Method: Demonstration;Verbal  Barriers to Learning: None  Education Outcome: Verbalized understanding;Demonstrated understanding    Thank you for this referral.  Carmelo Bullock PT, DPT  Minutes: 32

## 2023-09-13 NOTE — PROGRESS NOTES
[facility] Pharmacy Dosing Services: 09/13/23  Consult for Warfarin Dosing by Pharmacy by Dr. Sridhar Drake provided for this 82 yo male for indication of Afib  Day of Therapy: resumed from home. (PTA: Warfarin 3mg daily)  Dose to achieve an INR goal of 2-3    Order entered for  Warfarin  3mg ordered to be given today at 18:00. Significant drug interactions: Heparin drip dcd  Previous dose    PT/INR Lab Results   Component Value Date/Time    INR 1.2 09/13/2023 04:26 AM      Platelets Lab Results   Component Value Date/Time     09/13/2023 09:26 AM      H/H Lab Results   Component Value Date/Time    HGB 13.4 09/13/2023 09:26 AM        Pharmacy to follow daily and will provide subsequent Warfarin dosing based on clinical status.   DEVIN LOPEZ [facility])   Contact information [phone]

## 2023-09-14 VITALS
BODY MASS INDEX: 28.12 KG/M2 | RESPIRATION RATE: 16 BRPM | OXYGEN SATURATION: 99 % | TEMPERATURE: 97.5 F | HEIGHT: 66 IN | HEART RATE: 66 BPM | SYSTOLIC BLOOD PRESSURE: 95 MMHG | WEIGHT: 175 LBS | DIASTOLIC BLOOD PRESSURE: 70 MMHG

## 2023-09-14 LAB
ERYTHROCYTE [DISTWIDTH] IN BLOOD BY AUTOMATED COUNT: 14.5 % (ref 11.5–14.5)
HCT VFR BLD AUTO: 39 % (ref 36.6–50.3)
HGB BLD-MCNC: 12.9 G/DL (ref 12.1–17)
INR PPP: 1.2 (ref 0.9–1.1)
MCH RBC QN AUTO: 29.7 PG (ref 26–34)
MCHC RBC AUTO-ENTMCNC: 33.1 G/DL (ref 30–36.5)
MCV RBC AUTO: 89.7 FL (ref 80–99)
NRBC # BLD: 0 K/UL (ref 0–0.01)
NRBC BLD-RTO: 0 PER 100 WBC
PLATELET # BLD AUTO: 233 K/UL (ref 150–400)
PMV BLD AUTO: 11 FL (ref 8.9–12.9)
PROTHROMBIN TIME: 11.9 SEC (ref 9–11.1)
RBC # BLD AUTO: 4.35 M/UL (ref 4.1–5.7)
WBC # BLD AUTO: 9.7 K/UL (ref 4.1–11.1)

## 2023-09-14 PROCEDURE — 2580000003 HC RX 258: Performed by: SPECIALIST

## 2023-09-14 PROCEDURE — 36415 COLL VENOUS BLD VENIPUNCTURE: CPT

## 2023-09-14 PROCEDURE — 94761 N-INVAS EAR/PLS OXIMETRY MLT: CPT

## 2023-09-14 PROCEDURE — 85027 COMPLETE CBC AUTOMATED: CPT

## 2023-09-14 PROCEDURE — 85610 PROTHROMBIN TIME: CPT

## 2023-09-14 PROCEDURE — 6370000000 HC RX 637 (ALT 250 FOR IP): Performed by: SPECIALIST

## 2023-09-14 PROCEDURE — 6370000000 HC RX 637 (ALT 250 FOR IP): Performed by: HOSPITALIST

## 2023-09-14 PROCEDURE — 6370000000 HC RX 637 (ALT 250 FOR IP): Performed by: INTERNAL MEDICINE

## 2023-09-14 RX ORDER — CLOPIDOGREL BISULFATE 75 MG/1
75 TABLET ORAL DAILY
Qty: 30 TABLET | Refills: 0 | Status: SHIPPED | OUTPATIENT
Start: 2023-09-15 | End: 2023-10-15

## 2023-09-14 RX ORDER — LISINOPRIL 10 MG/1
10 TABLET ORAL DAILY
Qty: 30 TABLET | Refills: 0 | Status: SHIPPED | OUTPATIENT
Start: 2023-09-15 | End: 2023-09-14 | Stop reason: SDUPTHER

## 2023-09-14 RX ORDER — WARFARIN SODIUM 1 MG/1
3 TABLET ORAL
Status: DISCONTINUED | OUTPATIENT
Start: 2023-09-14 | End: 2023-09-14 | Stop reason: HOSPADM

## 2023-09-14 RX ORDER — ROSUVASTATIN CALCIUM 20 MG/1
20 TABLET, COATED ORAL DAILY
Qty: 30 TABLET | Refills: 0 | Status: SHIPPED | OUTPATIENT
Start: 2023-09-15 | End: 2023-10-15

## 2023-09-14 RX ORDER — LISINOPRIL 10 MG/1
5 TABLET ORAL DAILY
Qty: 15 TABLET | Refills: 0 | Status: SHIPPED | OUTPATIENT
Start: 2023-09-15 | End: 2023-09-14 | Stop reason: HOSPADM

## 2023-09-14 RX ADMIN — ROSUVASTATIN CALCIUM 20 MG: 10 TABLET, COATED ORAL at 08:16

## 2023-09-14 RX ADMIN — SODIUM CHLORIDE, PRESERVATIVE FREE 10 ML: 5 INJECTION INTRAVENOUS at 08:17

## 2023-09-14 RX ADMIN — METOPROLOL TARTRATE 25 MG: 25 TABLET, FILM COATED ORAL at 08:16

## 2023-09-14 RX ADMIN — PANTOPRAZOLE SODIUM 40 MG: 40 TABLET, DELAYED RELEASE ORAL at 06:04

## 2023-09-14 RX ADMIN — CLOPIDOGREL BISULFATE 75 MG: 75 TABLET ORAL at 08:16

## 2023-09-14 RX ADMIN — LISINOPRIL 10 MG: 5 TABLET ORAL at 08:16

## 2023-09-14 ASSESSMENT — PAIN SCALES - GENERAL: PAINLEVEL_OUTOF10: 0

## 2023-09-14 NOTE — PLAN OF CARE
Problem: Discharge Planning  Goal: Discharge to home or other facility with appropriate resources  9/14/2023 1223 by Ty Muhammad RN  Outcome: Completed  9/14/2023 1142 by Ty Muhammad RN  Outcome: Progressing     Problem: Safety - Adult  Goal: Free from fall injury  9/14/2023 1223 by Ty Muhammad RN  Outcome: Completed  9/14/2023 1142 by Ty Muhammad RN  Outcome: Progressing     Problem: Respiratory - Adult  Goal: Achieves optimal ventilation and oxygenation  9/14/2023 1223 by Ty Muhammad RN  Outcome: Completed  9/14/2023 1142 by Ty Muhammad RN  Outcome: Progressing     Problem: Pain  Goal: Verbalizes/displays adequate comfort level or baseline comfort level  9/14/2023 1223 by Ty Muhammad RN  Outcome: Completed  9/14/2023 1142 by Ty Muhammad RN  Outcome: Progressing     Problem: Skin/Tissue Integrity  Goal: Absence of new skin breakdown  Description: 1. Monitor for areas of redness and/or skin breakdown  2. Assess vascular access sites hourly  3. Every 4-6 hours minimum:  Change oxygen saturation probe site  4. Every 4-6 hours:  If on nasal continuous positive airway pressure, respiratory therapy assess nares and determine need for appliance change or resting period.   9/14/2023 1223 by Ty Muhammad RN  Outcome: Completed  9/14/2023 1142 by Ty Muhammad RN  Outcome: Progressing     Problem: ABCDS Injury Assessment  Goal: Absence of physical injury  9/14/2023 1223 by Ty Muhammad RN  Outcome: Completed  9/14/2023 1142 by Ty Muhammad RN  Outcome: Progressing

## 2023-09-14 NOTE — CARE COORDINATION
9/14/2023  12:31 PM  Care Management Progress Note      ICD-10-CM    1. Acute respiratory failure with hypoxia and hypercapnia (HCC)  J96.01     J96.02       2. Acute pulmonary edema (HCC)  J81.0       3. FLOR (acute kidney injury) ([address])  N17.9       4. Elevated troponin  R77.8       5. Acute respiratory failure with hypoxia (HCC)  J96.01 Transthoracic echocardiogram (TTE) complete with contrast, bubble, strain, and 3D PRN     Transthoracic echocardiogram (TTE) complete with contrast, bubble, strain, and 3D PRN      6. Chest pain  R07.9 Cardiac procedure     Cardiac procedure          RUR:  12%  Risk Level: [x]Low []Moderate []High  Value-based purchasing: [] Yes [x] No  Bundle patient: [] Yes [x] No   Specify:     Transition of care plan:  Discharge order submitted. Pt has medically cleared. Return to [facility] with intermittent personal care, and CM notified them of pt's discharge. CM met with pt to discuss [address] [place], [place] [address] services; however, pt refused as he feels it is not needed. Outpatient follow-up. Pt's family to transport.       09/11/23 1350   Service Assessment   Patient Orientation Alert and Oriented   Cognition Alert   History Provided By Child/Family  (daughter Keena Andrea)   Primary [address] Staff  (pt has aide through [facility] 2 Days/wk for 4 H)   Prior Functional Level Independent in ADLs/IADLs;Assistance with the following:;Housework; Shopping; Other (see comment)  (transportation)   Current Functional Level Assistance with the following:;Toileting   Can patient return to prior living arrangement Yes   Ability to make needs known: Good   Family able to assist with home care needs: Other (comment)  (pt currently  has aide through [facility] 2 Days/wk)   Would you like for me to discuss the discharge plan with any other family members/significant others, and if so, who?  Yes  (daughter Keena Andrea)   Financial Resources Medicare [facility] Medicare)

## 2023-09-14 NOTE — PROGRESS NOTES
[facility] Pharmacy Dosing Services: 09/14/23  Consult for Warfarin Dosing by Pharmacy by Dr. Emiliano Dominguez provided for this 82 yo male for indication of Afib  Day of Therapy: resumed from home. (PTA: Warfarin 3mg daily)  Dose to achieve an INR goal of 2-3    Order entered for  Warfarin  3mg ordered to be given today at 18:00. Significant drug interactions: Heparin drip dcd  Previous dose    PT/INR Lab Results   Component Value Date/Time    INR 1.2 09/14/2023 06:02 AM      Platelets Lab Results   Component Value Date/Time     09/14/2023 06:01 AM      H/H Lab Results   Component Value Date/Time    HGB 12.9 09/14/2023 06:01 AM        Pharmacy to follow daily and will provide subsequent Warfarin dosing based on clinical status.   DEVIN LOPEZ [facility])   Contact information [phone]

## 2023-09-15 NOTE — PROGRESS NOTES
Physician Progress Note      Shilo Barron  YVETTE #:                  [id number]  :                       1935  ADMIT DATE:       9/10/2023 2:56 AM  DISCH DATE:        2023 1:30 PM  RESPONDING  PROVIDER #:        Jose Alvarado DO          QUERY TEXT:    Good afternoon. Patient admitted with NSTEMI, noted to have persistent atrial fibrillation and   is maintained on Coumadin. Coumadin initially held for cardiac cath, but   resumed following procedure on . If possible, please document in progress notes and discharge summary if you   are evaluating and/or treating any of the following: The medical record reflects the following:    Risk Factors: 81 yr old male, persistent atrial fibrillation, pulmonary edema    Clinical Indicators: EKG: Atrial fibrillation with premature ventricular or   aberrantly conducted complexes Left axis deviation  Anteroseptal infarct , age undetermined Abnormal ECG;  IM PN: Persistent   A-fib - got K for cath, now titrating back up on warfarin for goal 2-3;    Cardiology brief op note: Will resume coumadin, cont BB, start plavix. Treatment: Coumadin, Cardiology consult, EKG      Thank you,  Rebecca Yanez RN, CDI  Options provided:  -- Secondary hypercoagulable state in a patient with atrial fibrillation  -- Other - I will add my own diagnosis  -- Disagree - Not applicable / Not valid  -- Disagree - Clinically unable to determine / Unknown  -- Refer to Clinical Documentation Reviewer    PROVIDER RESPONSE TEXT:    This patient has secondary hypercoagulable state in a patient with atrial   fibrillation.     Query created by: Rebecca Yanez on 2023 7:29 PM      Electronically signed by:  Jose Alvarado DO 2023 8:18 PM

## 2023-09-16 LAB
BACTERIA SPEC CULT: NORMAL
BACTERIA SPEC CULT: NORMAL
SERVICE CMNT-IMP: NORMAL
SERVICE CMNT-IMP: NORMAL

## 2023-09-19 ENCOUNTER — APPOINTMENT (OUTPATIENT)
Facility: HOSPITAL | Age: 88
DRG: 280 | End: 2023-09-19
Payer: MEDICARE

## 2023-09-19 ENCOUNTER — HOSPITAL ENCOUNTER (INPATIENT)
Facility: HOSPITAL | Age: 88
LOS: 2 days | Discharge: HOME | DRG: 280 | End: 2023-09-21
Attending: EMERGENCY MEDICINE | Admitting: HOSPITALIST
Payer: MEDICARE

## 2023-09-19 DIAGNOSIS — R07.9 CHEST PAIN, UNSPECIFIED TYPE: ICD-10-CM

## 2023-09-19 DIAGNOSIS — J81.0 ACUTE PULMONARY EDEMA (HCC): Primary | ICD-10-CM

## 2023-09-19 DIAGNOSIS — R06.02 SOB (SHORTNESS OF BREATH): ICD-10-CM

## 2023-09-19 LAB
ALBUMIN SERPL-MCNC: 3.3 G/DL (ref 3.5–5)
ALBUMIN/GLOB SERPL: 0.8 (ref 1.1–2.2)
ALP SERPL-CCNC: 56 U/L (ref 45–117)
ALT SERPL-CCNC: 26 U/L (ref 12–78)
ANION GAP SERPL CALC-SCNC: 7 MMOL/L (ref 5–15)
AST SERPL-CCNC: 34 U/L (ref 15–37)
BASOPHILS # BLD: 0 K/UL (ref 0–0.1)
BASOPHILS NFR BLD: 1 % (ref 0–1)
BILIRUB SERPL-MCNC: 1.4 MG/DL (ref 0.2–1)
BUN SERPL-MCNC: 30 MG/DL (ref 6–20)
BUN/CREAT SERPL: 19 (ref 12–20)
CALCIUM SERPL-MCNC: 8.7 MG/DL (ref 8.5–10.1)
CHLORIDE SERPL-SCNC: 105 MMOL/L (ref 97–108)
CO2 SERPL-SCNC: 25 MMOL/L (ref 21–32)
COMMENT:: NORMAL
CREAT SERPL-MCNC: 1.57 MG/DL (ref 0.7–1.3)
DIFFERENTIAL METHOD BLD: ABNORMAL
EOSINOPHIL # BLD: 0.2 K/UL (ref 0–0.4)
EOSINOPHIL NFR BLD: 2 % (ref 0–7)
ERYTHROCYTE [DISTWIDTH] IN BLOOD BY AUTOMATED COUNT: 15 % (ref 11.5–14.5)
GLOBULIN SER CALC-MCNC: 4.2 G/DL (ref 2–4)
GLUCOSE SERPL-MCNC: 121 MG/DL (ref 65–100)
HCT VFR BLD AUTO: 35.6 % (ref 36.6–50.3)
HGB BLD-MCNC: 11.6 G/DL (ref 12.1–17)
IMM GRANULOCYTES # BLD AUTO: 0 K/UL (ref 0–0.04)
IMM GRANULOCYTES NFR BLD AUTO: 1 % (ref 0–0.5)
INR PPP: 1.5 (ref 0.9–1.1)
LYMPHOCYTES # BLD: 1.6 K/UL (ref 0.8–3.5)
LYMPHOCYTES NFR BLD: 19 % (ref 12–49)
MCH RBC QN AUTO: 29.4 PG (ref 26–34)
MCHC RBC AUTO-ENTMCNC: 32.6 G/DL (ref 30–36.5)
MCV RBC AUTO: 90.1 FL (ref 80–99)
MONOCYTES # BLD: 0.6 K/UL (ref 0–1)
MONOCYTES NFR BLD: 7 % (ref 5–13)
NEUTS SEG # BLD: 6.1 K/UL (ref 1.8–8)
NEUTS SEG NFR BLD: 70 % (ref 32–75)
NRBC # BLD: 0 K/UL (ref 0–0.01)
NRBC BLD-RTO: 0 PER 100 WBC
NT PRO BNP: 2272 PG/ML
PLATELET # BLD AUTO: 265 K/UL (ref 150–400)
PMV BLD AUTO: 10.7 FL (ref 8.9–12.9)
POTASSIUM SERPL-SCNC: 3.2 MMOL/L (ref 3.5–5.1)
PROT SERPL-MCNC: 7.5 G/DL (ref 6.4–8.2)
PROTHROMBIN TIME: 15.6 SEC (ref 9–11.1)
RBC # BLD AUTO: 3.95 M/UL (ref 4.1–5.7)
SODIUM SERPL-SCNC: 137 MMOL/L (ref 136–145)
SPECIMEN HOLD: NORMAL
TROPONIN I SERPL HS-MCNC: 167 NG/L (ref 0–76)
TROPONIN I SERPL HS-MCNC: 5249 NG/L (ref 0–76)
WBC # BLD AUTO: 8.6 K/UL (ref 4.1–11.1)

## 2023-09-19 PROCEDURE — 36415 COLL VENOUS BLD VENIPUNCTURE: CPT

## 2023-09-19 PROCEDURE — 6360000002 HC RX W HCPCS: Performed by: HOSPITALIST

## 2023-09-19 PROCEDURE — 6370000000 HC RX 637 (ALT 250 FOR IP): Performed by: HOSPITALIST

## 2023-09-19 PROCEDURE — 71250 CT THORAX DX C-: CPT

## 2023-09-19 PROCEDURE — 83880 ASSAY OF NATRIURETIC PEPTIDE: CPT

## 2023-09-19 PROCEDURE — 71045 X-RAY EXAM CHEST 1 VIEW: CPT

## 2023-09-19 PROCEDURE — 94761 N-INVAS EAR/PLS OXIMETRY MLT: CPT

## 2023-09-19 PROCEDURE — 1100000000 HC RM PRIVATE

## 2023-09-19 PROCEDURE — 2580000003 HC RX 258: Performed by: HOSPITALIST

## 2023-09-19 PROCEDURE — 93005 ELECTROCARDIOGRAM TRACING: CPT | Performed by: EMERGENCY MEDICINE

## 2023-09-19 PROCEDURE — 80053 COMPREHEN METABOLIC PANEL: CPT

## 2023-09-19 PROCEDURE — 99285 EMERGENCY DEPT VISIT HI MDM: CPT

## 2023-09-19 PROCEDURE — 2700000000 HC OXYGEN THERAPY PER DAY

## 2023-09-19 PROCEDURE — 85025 COMPLETE CBC W/AUTO DIFF WBC: CPT

## 2023-09-19 PROCEDURE — 84484 ASSAY OF TROPONIN QUANT: CPT

## 2023-09-19 PROCEDURE — 85610 PROTHROMBIN TIME: CPT

## 2023-09-19 RX ORDER — ROSUVASTATIN CALCIUM 10 MG/1
20 TABLET, COATED ORAL DAILY
Status: DISCONTINUED | OUTPATIENT
Start: 2023-09-19 | End: 2023-09-21 | Stop reason: HOSPADM

## 2023-09-19 RX ORDER — SODIUM CHLORIDE 0.9 % (FLUSH) 0.9 %
5-40 SYRINGE (ML) INJECTION PRN
Status: DISCONTINUED | OUTPATIENT
Start: 2023-09-19 | End: 2023-09-21 | Stop reason: HOSPADM

## 2023-09-19 RX ORDER — WARFARIN SODIUM 1 MG/1
3 TABLET ORAL
Status: COMPLETED | OUTPATIENT
Start: 2023-09-19 | End: 2023-09-19

## 2023-09-19 RX ORDER — SODIUM CHLORIDE 0.9 % (FLUSH) 0.9 %
5-40 SYRINGE (ML) INJECTION EVERY 12 HOURS SCHEDULED
Status: DISCONTINUED | OUTPATIENT
Start: 2023-09-19 | End: 2023-09-21 | Stop reason: HOSPADM

## 2023-09-19 RX ORDER — OXYBUTYNIN CHLORIDE 5 MG/1
5 TABLET ORAL 2 TIMES DAILY
Status: DISCONTINUED | OUTPATIENT
Start: 2023-09-19 | End: 2023-09-21 | Stop reason: HOSPADM

## 2023-09-19 RX ORDER — ENOXAPARIN SODIUM 100 MG/ML
80 INJECTION SUBCUTANEOUS EVERY 24 HOURS
Status: DISCONTINUED | OUTPATIENT
Start: 2023-09-19 | End: 2023-09-20

## 2023-09-19 RX ORDER — POTASSIUM CHLORIDE 750 MG/1
40 TABLET, FILM COATED, EXTENDED RELEASE ORAL ONCE
Status: COMPLETED | OUTPATIENT
Start: 2023-09-19 | End: 2023-09-19

## 2023-09-19 RX ORDER — PANTOPRAZOLE SODIUM 40 MG/1
40 TABLET, DELAYED RELEASE ORAL
Status: DISCONTINUED | OUTPATIENT
Start: 2023-09-20 | End: 2023-09-21 | Stop reason: HOSPADM

## 2023-09-19 RX ORDER — ONDANSETRON 4 MG/1
4 TABLET, ORALLY DISINTEGRATING ORAL EVERY 8 HOURS PRN
Status: DISCONTINUED | OUTPATIENT
Start: 2023-09-19 | End: 2023-09-21 | Stop reason: HOSPADM

## 2023-09-19 RX ORDER — SUCRALFATE 1 G/1
1 TABLET ORAL 2 TIMES DAILY
Status: DISCONTINUED | OUTPATIENT
Start: 2023-09-19 | End: 2023-09-21 | Stop reason: HOSPADM

## 2023-09-19 RX ORDER — CLOPIDOGREL BISULFATE 75 MG/1
75 TABLET ORAL DAILY
Status: DISCONTINUED | OUTPATIENT
Start: 2023-09-19 | End: 2023-09-21 | Stop reason: HOSPADM

## 2023-09-19 RX ORDER — FUROSEMIDE 10 MG/ML
40 INJECTION INTRAMUSCULAR; INTRAVENOUS 2 TIMES DAILY
Status: COMPLETED | OUTPATIENT
Start: 2023-09-19 | End: 2023-09-20

## 2023-09-19 RX ORDER — SODIUM CHLORIDE 9 MG/ML
INJECTION, SOLUTION INTRAVENOUS PRN
Status: DISCONTINUED | OUTPATIENT
Start: 2023-09-19 | End: 2023-09-21 | Stop reason: HOSPADM

## 2023-09-19 RX ORDER — ONDANSETRON 2 MG/ML
4 INJECTION INTRAMUSCULAR; INTRAVENOUS EVERY 6 HOURS PRN
Status: DISCONTINUED | OUTPATIENT
Start: 2023-09-19 | End: 2023-09-21 | Stop reason: HOSPADM

## 2023-09-19 RX ORDER — POLYETHYLENE GLYCOL 3350 17 G/17G
17 POWDER, FOR SOLUTION ORAL DAILY PRN
Status: DISCONTINUED | OUTPATIENT
Start: 2023-09-19 | End: 2023-09-21 | Stop reason: HOSPADM

## 2023-09-19 RX ORDER — ACETAMINOPHEN 325 MG/1
650 TABLET ORAL EVERY 6 HOURS PRN
Status: DISCONTINUED | OUTPATIENT
Start: 2023-09-19 | End: 2023-09-21 | Stop reason: HOSPADM

## 2023-09-19 RX ORDER — ACETAMINOPHEN 650 MG/1
650 SUPPOSITORY RECTAL EVERY 6 HOURS PRN
Status: DISCONTINUED | OUTPATIENT
Start: 2023-09-19 | End: 2023-09-21 | Stop reason: HOSPADM

## 2023-09-19 RX ADMIN — FUROSEMIDE 40 MG: 10 INJECTION, SOLUTION INTRAMUSCULAR; INTRAVENOUS at 18:06

## 2023-09-19 RX ADMIN — OXYBUTYNIN CHLORIDE 5 MG: 5 TABLET ORAL at 20:54

## 2023-09-19 RX ADMIN — ENOXAPARIN SODIUM 80 MG: 100 INJECTION SUBCUTANEOUS at 18:27

## 2023-09-19 RX ADMIN — WARFARIN SODIUM 3 MG: 1 TABLET ORAL at 18:27

## 2023-09-19 RX ADMIN — SUCRALFATE 1 G: 1 TABLET ORAL at 20:54

## 2023-09-19 RX ADMIN — POTASSIUM CHLORIDE 40 MEQ: 750 TABLET, FILM COATED, EXTENDED RELEASE ORAL at 18:06

## 2023-09-19 RX ADMIN — METOPROLOL TARTRATE 12.5 MG: 25 TABLET, FILM COATED ORAL at 20:54

## 2023-09-19 RX ADMIN — ROSUVASTATIN CALCIUM 20 MG: 10 TABLET, COATED ORAL at 18:08

## 2023-09-19 RX ADMIN — CLOPIDOGREL BISULFATE 75 MG: 75 TABLET ORAL at 18:28

## 2023-09-19 RX ADMIN — SODIUM CHLORIDE, PRESERVATIVE FREE 10 ML: 5 INJECTION INTRAVENOUS at 20:54

## 2023-09-19 ASSESSMENT — ENCOUNTER SYMPTOMS: SHORTNESS OF BREATH: 1

## 2023-09-19 ASSESSMENT — PAIN DESCRIPTION - LOCATION: LOCATION: CHEST

## 2023-09-19 ASSESSMENT — PAIN SCALES - GENERAL
PAINLEVEL_OUTOF10: 0
PAINLEVEL_OUTOF10: 4

## 2023-09-19 ASSESSMENT — PAIN - FUNCTIONAL ASSESSMENT: PAIN_FUNCTIONAL_ASSESSMENT: 0-10

## 2023-09-19 ASSESSMENT — PAIN DESCRIPTION - DESCRIPTORS: DESCRIPTORS: PRESSURE

## 2023-09-19 NOTE — ED TRIAGE NOTES
Patient arrived via EMS from home with c/o diffuse chest pressure that started suddenly about 1 hour ago. Patient given 0.4sl nitro en route, NO asa given poa. Patient c/o shortness of breath, patient 92% on room air, placed on 2 liters now 95%     Patient takes coumadin daily    Patient had cardiac cath a few days ago with Dr. Aundrea Cordero showing 1v disease in LAD.  Patient opted for med mgmt at that time due to high risk pci

## 2023-09-19 NOTE — H&P
[address] [place] [address] Neisha Tong Robertberg  [phone]    [address] [place] [address] Adult  Hospitalist Group    History & Physical    Date of service: 9/19/2023    Patient name: Melquiades Chester  MRN: [id number]  YOB: 1935  Age: 81 y.o. Primary care provider:  Shar Jackson. Source of Information: patient, medical records and wife                                  Chief complain: Chest pain    History of present illness  Melquiades Chester is a 81 y.o. male who presented with chest pain. Patient is alert awake oriented x3. Patient has a history of coronary artery disease and he was recently admitted to this hospital with an NSTEMI and had a cardiac catheter done on 9/12/2023. Patient was found to have high risk PCI of ostial proximal LAD and patient was recommended medical management after discussing various options regarding CABG, PCI versus medical management. Patient is DNR and he does not want any CABG or PCI at this time. Patient started having chest pain around 7 AM today. It was located in the anterior chest.  It was 9 out of 10 in intensity. Patient was not short of breath as well so EMS was called. They gave him nitro after that his chest pain resolved. He denies any chest pain at this time. His blood work showed slightly elevated troponin of 167 and his proBNP is 2272. He also has a potassium of 3.2. Patient has a history of atrial fibrillation and is on metoprolol and Coumadin. He was taking verapamil in the past.  He was on Lasix in the past but it was stopped during last admission. His CT scan showed mild pulmonary interstitial edema.     Past Medical History:   Diagnosis Date    Atrial fibrillation [facility])       Past Surgical History:   Procedure Laterality Date    CARDIAC PROCEDURE N/A 9/12/2023    Left heart cath / coronary angiography performed by Elke Langston MD at [address] [place] [address]       Prior to Admission

## 2023-09-19 NOTE — ED NOTES
Bedside and Verbal shift change report given to Rico Olmedo RN (oncoming nurse) by myself (offgoing nurse). Report included the following information Nurse Handoff Report, ED Encounter Summary, ED SBAR, [facility], Recent Results, and Cardiac Rhythm A-fib .        Leyla Chase RN  09/19/23 2977

## 2023-09-19 NOTE — ED NOTES
TRANSFER - OUT REPORT:    Verbal report given to [facility] on Shira Quarles  being transferred to 4th Floor for routine progression of patient care       Report consisted of patient's Situation, Background, Assessment and   Recommendations(SBAR). Information from the following report(s) ED Encounter Summary, ED SBAR, [facility], and Recent Results was reviewed with the receiving nurse. Gattman Fall Assessment:                           Lines:   Peripheral IV Left Antecubital (Active)        Opportunity for questions and clarification was provided.       Patient transported with:  Serina Armendariz @[address] [place] [address]           Elin Lamar RN  09/19/23 1435

## 2023-09-19 NOTE — PROGRESS NOTES
[facility] Pharmacy Dosing Services: 09/19/23  Consult for Warfarin Dosing by Pharmacy by Dr. Mateo Shin provided for this 82 yo male for indication of Afib  Day of Therapy: resumed from home. (PRA: Warfarin 3mg daily)  Dose to achieve an INR goal of 2-3    Order entered for  Warfarin  3 (mg) ordered to be given today at 18:00. Significant drug interactions: None  Previous dose  3 mg yesterday per patient. PT/INR Lab Results   Component Value Date/Time    INR 1.5 09/19/2023 03:57 PM      Platelets Lab Results   Component Value Date/Time     09/19/2023 09:36 AM      H/H Lab Results   Component Value Date/Time    HGB 11.6 09/19/2023 09:36 AM        Pharmacy to follow daily and will provide subsequent Warfarin dosing based on clinical status.   DEVIN LOPEZ, [address] [place] [address])   Contact information [phone]

## 2023-09-19 NOTE — PROGRESS NOTES
1845: PT refusing bed alarm, educated on risk of falls and encouraged patient to call out when he needs to get up, patient verbalized understanding and stated \"I dont want it on\". 1934: Laura served Dr. Kaelyn Mcdonald to inquire about patient's code status as physician note states patient is DNR, however, full code noted in chart- no response at this time, also notified physician of critical troponin: [address]: Bedside shift change report given to Faith HAN (oncoming nurse) by Flaquita Harris RN (offgoing nurse). Report included the following information Nurse Handoff Report, Index, ED Encounter Summary, Adult Overview, MAR, Recent Results, and Med Rec Status.

## 2023-09-19 NOTE — ED PROVIDER NOTES
[facility] EMERGENCY DEPT  EMERGENCY DEPARTMENT ENCOUNTER      Pt Name: Chris Mark  MRN: [id number]  [address] [facility] [place] 1935  Date of evaluation: 9/19/2023  Provider: Yosi Poon MD    CHIEF COMPLAINT       Chief Complaint   Patient presents with    Chest Pain         HISTORY OF PRESENT ILLNESS   (Location/Symptom, Timing/Onset, Context/Setting, Quality, Duration, Modifying Factors, Severity)  Note limiting factors. Mr. Boone Mota is an 82yo male who presents to the ER with complaints of chest pain. He states that he began to have worsening chest pain earlier today. He recently was admitted to the hospital for the same. He describes feeling short of breath that is chronic but less gotten worse over the last day or 2. He was recently discharged the hospital and had his diuretic stopped at that time. He has had some swelling in his bilateral lower extremities. He has a chronic cough that is turned productive over the last day. He denies any fevers or chills. He denies any other complaints. Review of External Medical Records:     Nursing Notes were reviewed. REVIEW OF SYSTEMS    (2-9 systems for level 4, 10 or more for level 5)     Review of Systems   Respiratory:  Positive for shortness of breath. Cardiovascular:  Positive for chest pain and leg swelling. Except as noted above the remainder of the review of systems was reviewed and negative.        PAST MEDICAL HISTORY     Past Medical History:   Diagnosis Date    Atrial fibrillation [facility])          SURGICAL HISTORY       Past Surgical History:   Procedure Laterality Date    CARDIAC PROCEDURE N/A 9/12/2023    Left heart cath / coronary angiography performed by Kiran Aguilar MD at [address] [place] [address]       Previous Medications    CLOPIDOGREL (PLAVIX) 75 MG TABLET    Take 1 tablet by mouth daily    METOPROLOL TARTRATE (LOPRESSOR) 25 MG TABLET    Take 0.5 tablets by mouth 2 times daily Medical Decision Making  Amount and/or Complexity of Data Reviewed  Labs: ordered. Radiology: ordered. ECG/medicine tests: ordered. Risk  Decision regarding hospitalization. Perfect Serve Consult for Admission  12:44 PM    ED Room Number: GW69/02  Patient Name and age:  Isaac Barron 81 y.o.  male  Working Diagnosis:   1. Acute pulmonary edema (HCC)    2. SOB (shortness of breath)    3. Chest pain, unspecified type        COVID-19 Suspicion: No  Sepsis present:  No  Reassessment needed: No  Code Status:  Do Not Resuscitate  Readmission: Yes  Isolation Requirements: no  Recommended Level of Care: telemetry  Department: [facility] ED - [phone]      Other:  recently had diuretic stopped        [facility] is an 80yo male who presents to the ER with complaints of chest pain. He is also short of breath with increased work of breathing. CT shows pulmonary edema. He recently had his diuretic stopped several days ago. He is to be evaluated for admission by hospitalist.      REASSESSMENT            CONSULTS:  None    PROCEDURES:  Unless otherwise noted below, none     Procedures      FINAL IMPRESSION      1. Acute pulmonary edema (HCC)    2. SOB (shortness of breath)    3. Chest pain, unspecified type          DISPOSITION/PLAN   DISPOSITION Decision To Admit 09/19/2023 12:41:26 PM      PATIENT REFERRED TO:  No follow-up provider specified.     DISCHARGE MEDICATIONS:  New Prescriptions    No medications on file         (Please note that portions of this note were completed with a voice recognition program.  Efforts were made to edit the dictations but occasionally words are mis-transcribed.)    Alex Emerson MD (electronically signed)  Emergency Attending Physician / Physician Assistant / Nurse Practitioner             Arjun Zarate MD  09/19/23 [phone]

## 2023-09-20 PROBLEM — I21.4 NSTEMI (NON-ST ELEVATED MYOCARDIAL INFARCTION) (HCC): Status: ACTIVE | Noted: 2023-09-20

## 2023-09-20 LAB
ANION GAP SERPL CALC-SCNC: 3 MMOL/L (ref 5–15)
BASOPHILS # BLD: 0 K/UL (ref 0–0.1)
BASOPHILS NFR BLD: 1 % (ref 0–1)
BUN SERPL-MCNC: 30 MG/DL (ref 6–20)
BUN/CREAT SERPL: 20 (ref 12–20)
CALCIUM SERPL-MCNC: 8.3 MG/DL (ref 8.5–10.1)
CHLORIDE SERPL-SCNC: 107 MMOL/L (ref 97–108)
CO2 SERPL-SCNC: 29 MMOL/L (ref 21–32)
CREAT SERPL-MCNC: 1.49 MG/DL (ref 0.7–1.3)
DIFFERENTIAL METHOD BLD: ABNORMAL
EKG DIAGNOSIS: NORMAL
EKG Q-T INTERVAL: 378 MS
EKG QRS DURATION: 98 MS
EKG QTC CALCULATION (BAZETT): 449 MS
EKG R AXIS: -36 DEGREES
EKG T AXIS: 112 DEGREES
EKG VENTRICULAR RATE: 85 BPM
EOSINOPHIL # BLD: 0.1 K/UL (ref 0–0.4)
EOSINOPHIL NFR BLD: 2 % (ref 0–7)
ERYTHROCYTE [DISTWIDTH] IN BLOOD BY AUTOMATED COUNT: 15.3 % (ref 11.5–14.5)
GLUCOSE SERPL-MCNC: 92 MG/DL (ref 65–100)
HCT VFR BLD AUTO: 33.1 % (ref 36.6–50.3)
HGB BLD-MCNC: 10.5 G/DL (ref 12.1–17)
IMM GRANULOCYTES # BLD AUTO: 0 K/UL (ref 0–0.04)
IMM GRANULOCYTES NFR BLD AUTO: 1 % (ref 0–0.5)
INR PPP: 1.7 (ref 0.9–1.1)
LYMPHOCYTES # BLD: 1.6 K/UL (ref 0.8–3.5)
LYMPHOCYTES NFR BLD: 20 % (ref 12–49)
MCH RBC QN AUTO: 28.7 PG (ref 26–34)
MCHC RBC AUTO-ENTMCNC: 31.7 G/DL (ref 30–36.5)
MCV RBC AUTO: 90.4 FL (ref 80–99)
MONOCYTES # BLD: 0.7 K/UL (ref 0–1)
MONOCYTES NFR BLD: 9 % (ref 5–13)
NEUTS SEG # BLD: 5.3 K/UL (ref 1.8–8)
NEUTS SEG NFR BLD: 67 % (ref 32–75)
NRBC # BLD: 0 K/UL (ref 0–0.01)
NRBC BLD-RTO: 0 PER 100 WBC
PLATELET # BLD AUTO: 244 K/UL (ref 150–400)
PMV BLD AUTO: 10.8 FL (ref 8.9–12.9)
POTASSIUM SERPL-SCNC: 4.1 MMOL/L (ref 3.5–5.1)
PROTHROMBIN TIME: 16.7 SEC (ref 9–11.1)
RBC # BLD AUTO: 3.66 M/UL (ref 4.1–5.7)
SODIUM SERPL-SCNC: 139 MMOL/L (ref 136–145)
TROPONIN I SERPL HS-MCNC: 5947 NG/L (ref 0–76)
WBC # BLD AUTO: 7.7 K/UL (ref 4.1–11.1)

## 2023-09-20 PROCEDURE — 6360000002 HC RX W HCPCS: Performed by: SPECIALIST

## 2023-09-20 PROCEDURE — 85610 PROTHROMBIN TIME: CPT

## 2023-09-20 PROCEDURE — 6370000000 HC RX 637 (ALT 250 FOR IP): Performed by: SPECIALIST

## 2023-09-20 PROCEDURE — 2580000003 HC RX 258: Performed by: HOSPITALIST

## 2023-09-20 PROCEDURE — 6370000000 HC RX 637 (ALT 250 FOR IP): Performed by: HOSPITALIST

## 2023-09-20 PROCEDURE — 6370000000 HC RX 637 (ALT 250 FOR IP): Performed by: INTERNAL MEDICINE

## 2023-09-20 PROCEDURE — 93010 ELECTROCARDIOGRAM REPORT: CPT | Performed by: STUDENT IN AN ORGANIZED HEALTH CARE EDUCATION/TRAINING PROGRAM

## 2023-09-20 PROCEDURE — 2700000000 HC OXYGEN THERAPY PER DAY

## 2023-09-20 PROCEDURE — 94761 N-INVAS EAR/PLS OXIMETRY MLT: CPT

## 2023-09-20 PROCEDURE — 80048 BASIC METABOLIC PNL TOTAL CA: CPT

## 2023-09-20 PROCEDURE — 6360000002 HC RX W HCPCS: Performed by: INTERNAL MEDICINE

## 2023-09-20 PROCEDURE — 36415 COLL VENOUS BLD VENIPUNCTURE: CPT

## 2023-09-20 PROCEDURE — 84484 ASSAY OF TROPONIN QUANT: CPT

## 2023-09-20 PROCEDURE — 1100000000 HC RM PRIVATE

## 2023-09-20 PROCEDURE — 85025 COMPLETE CBC W/AUTO DIFF WBC: CPT

## 2023-09-20 RX ORDER — ISOSORBIDE MONONITRATE 30 MG/1
15 TABLET, EXTENDED RELEASE ORAL DAILY
Status: DISCONTINUED | OUTPATIENT
Start: 2023-09-20 | End: 2023-09-21

## 2023-09-20 RX ORDER — WARFARIN SODIUM 1 MG/1
3 TABLET ORAL
Status: COMPLETED | OUTPATIENT
Start: 2023-09-20 | End: 2023-09-20

## 2023-09-20 RX ORDER — ENOXAPARIN SODIUM 100 MG/ML
1 INJECTION SUBCUTANEOUS 2 TIMES DAILY
Status: DISCONTINUED | OUTPATIENT
Start: 2023-09-20 | End: 2023-09-21 | Stop reason: HOSPADM

## 2023-09-20 RX ADMIN — ENOXAPARIN SODIUM 70 MG: 100 INJECTION SUBCUTANEOUS at 10:56

## 2023-09-20 RX ADMIN — FUROSEMIDE 40 MG: 10 INJECTION, SOLUTION INTRAMUSCULAR; INTRAVENOUS at 10:55

## 2023-09-20 RX ADMIN — ENOXAPARIN SODIUM 70 MG: 100 INJECTION SUBCUTANEOUS at 22:08

## 2023-09-20 RX ADMIN — METOPROLOL TARTRATE 12.5 MG: 25 TABLET, FILM COATED ORAL at 22:07

## 2023-09-20 RX ADMIN — OXYBUTYNIN CHLORIDE 5 MG: 5 TABLET ORAL at 22:07

## 2023-09-20 RX ADMIN — METOPROLOL TARTRATE 12.5 MG: 25 TABLET, FILM COATED ORAL at 10:55

## 2023-09-20 RX ADMIN — CLOPIDOGREL BISULFATE 75 MG: 75 TABLET ORAL at 10:55

## 2023-09-20 RX ADMIN — WARFARIN SODIUM 3 MG: 1 TABLET ORAL at 17:16

## 2023-09-20 RX ADMIN — OXYBUTYNIN CHLORIDE 5 MG: 5 TABLET ORAL at 10:55

## 2023-09-20 RX ADMIN — ISOSORBIDE MONONITRATE 15 MG: 30 TABLET, EXTENDED RELEASE ORAL at 10:55

## 2023-09-20 RX ADMIN — FUROSEMIDE 40 MG: 10 INJECTION, SOLUTION INTRAMUSCULAR; INTRAVENOUS at 17:16

## 2023-09-20 RX ADMIN — SUCRALFATE 1 G: 1 TABLET ORAL at 10:55

## 2023-09-20 RX ADMIN — ROSUVASTATIN CALCIUM 20 MG: 10 TABLET, COATED ORAL at 10:55

## 2023-09-20 RX ADMIN — SODIUM CHLORIDE, PRESERVATIVE FREE 10 ML: 5 INJECTION INTRAVENOUS at 10:57

## 2023-09-20 RX ADMIN — ACETAMINOPHEN 650 MG: 325 TABLET ORAL at 02:14

## 2023-09-20 RX ADMIN — SUCRALFATE 1 G: 1 TABLET ORAL at 22:07

## 2023-09-20 ASSESSMENT — PAIN DESCRIPTION - LOCATION: LOCATION: GENERALIZED

## 2023-09-20 ASSESSMENT — PAIN SCALES - GENERAL
PAINLEVEL_OUTOF10: 0
PAINLEVEL_OUTOF10: 2

## 2023-09-20 NOTE — PROGRESS NOTES
[facility] Pharmacy Dosing Services: 09/20/23  Consult for Warfarin Dosing by Pharmacy by Dr. Jabari Arciniega provided for this 80 yo male for indication of Afib  Day of Therapy: resumed from home. (PTA: Warfarin 3mg daily)  Dose to achieve an INR goal of 2-3    Order entered for  Warfarin  3 mg ordered to be given today at 18:00. Significant drug interactions: Lovenox bridge until INR >2 x2 days (NSTEMI)  Previous dose     PT/INR Lab Results   Component Value Date/Time    INR 1.7 09/20/2023 01:48 AM      Platelets Lab Results   Component Value Date/Time     09/20/2023 01:48 AM      H/H Lab Results   Component Value Date/Time    HGB 10.5 09/20/2023 01:48 AM        Pharmacy to follow daily and will provide subsequent Warfarin dosing based on clinical status.   DEVIN LOPEZ, [address] [facility])   Contact information [phone]

## 2023-09-20 NOTE — PROGRESS NOTES
Hospitalist Progress Note      NAME:  Abhilash Coppola   :  1935  MRM:  [id number]    Date/Time: 2023  5:26 PM           Assessment / Plan:     1. Chest pain, NSTEMI   - ms tele. Increase med mgt as able, BB statin, plavix   - cards advises to avoid verapamil or lisinopril, adding imdur and needs nitro sl prn      2. Acute hypoxemic respiratory failure-patient is on 2 L of oxygen by nasal cannula. Patient is short of breath while talking to me. Chest x-ray showed interstitial edema. Patient will be started on IV Lasix 40 mg twice daily     3. Atrial fibrillation-rates controlled, coumadin, cs to pharmacy as inr low     4. Hypokalemia-resolved     Patient is DNR. His daughter is the power of . Plan of care discussed with the patient and his daughter at bedside. Diet: 2 g sodium  Activity: as able  DVT prophylaxis: Coumadin  Isolation precautions: None  Dispo: 1-2 days for dispo. Confer w cards in am           ___________________________________________________    Attending Physician: Zac Matthew DO        Subjective:     Chief Complaint:  chest pain    Encountered bedside w daughter present  No cp at this time, no sob nvd      ROS:  (bold if positive, if negative)    Tolerating PT  Tolerating Diet          Objective:       Vitals:          Last 24hrs VS reviewed since prior progress note.  Most recent are:    Vitals:    23 1519   BP: 101/66   Pulse: 75   Resp:    Temp:    SpO2: 99%     SpO2 Readings from Last 6 Encounters:   23 99%   23 99%          Intake/Output Summary (Last 24 hours) at 2023 1726  Last data filed at 2023 0300  Gross per 24 hour   Intake --   Output 1100 ml   Net -1100 ml          Exam:     Physical Exam:    Gen:  Well-developed, well-nourished, in no acute distress  HEENT:  Pink conjunctivae, PERRL, hearing intact to voice, moist mucous membranes  Neck:  Supple, without masses, thyroid non-tender  Resp:  No accessory muscle use, clear

## 2023-09-20 NOTE — CONSULTS
Thaddeus Dominguez MD, [address]  [phone]    Date of  Admission: 9/19/2023  9:25 AM         IMPRESSION and RECOMMENDATIONS     Chronic AF:  Intrinsically rate-controlled. Coumadin for CVA prophylaxis. NSTEMI. Cath showed severe ost/prox LAD stenosis. Cont med mgmt. See note from 9/12. Cont BB, statin, plavix. He should not go home on verapamil or lisinopril. Will try to add antianginal (imdur) as BP tolerates. Also, should go home on NTG 0.4mg SL prn CP. I have discussed this plan with the patient. He appears to understand this plan and wishes to proceed ahead. History of Present Illness:     Jenna Parra is a 81 y.o. male with the above problem list who was admitted for Acute pulmonary edema (HCC) [J81.0]  SOB (shortness of breath) [R06.02]  Chest pain, unspecified type [R07.9]. Mr. Edmond Antonio is an 80yo male who presents to the ER with complaints of chest pain. He states that he began to have worsening chest pain earlier today. He recently was admitted to the hospital for the same. He describes feeling short of breath that is chronic but less gotten worse over the last day or 2. He was recently discharged the hospital and had his diuretic stopped at that time. He has had some swelling in his bilateral lower extremities. He has a chronic cough that is turned productive over the last day. He denies any fevers or chills. He denies any other complaints. He denies shortness of breath, dyspnea on exertion, orthopnea, paroxysmal noctural dyspnea, lower extremity edema, palpitations, syncope, or near-syncope.     Current Facility-Administered Medications   Medication Dose Route Frequency    enoxaparin (LOVENOX) injection 70 mg  1 mg/kg SubCUTAneous BID    clopidogrel (PLAVIX) tablet 75 mg  75 mg Oral Daily    metoprolol tartrate (LOPRESSOR) tablet 12.5 mg  12.5 mg Oral BID    pantoprazole

## 2023-09-21 VITALS
HEART RATE: 76 BPM | WEIGHT: 165 LBS | HEIGHT: 66 IN | RESPIRATION RATE: 18 BRPM | SYSTOLIC BLOOD PRESSURE: 97 MMHG | TEMPERATURE: 97.9 F | DIASTOLIC BLOOD PRESSURE: 62 MMHG | OXYGEN SATURATION: 97 % | BODY MASS INDEX: 26.52 KG/M2

## 2023-09-21 LAB
ANION GAP SERPL CALC-SCNC: 6 MMOL/L (ref 5–15)
BUN SERPL-MCNC: 33 MG/DL (ref 6–20)
BUN/CREAT SERPL: 19 (ref 12–20)
CALCIUM SERPL-MCNC: 8.3 MG/DL (ref 8.5–10.1)
CHLORIDE SERPL-SCNC: 103 MMOL/L (ref 97–108)
CO2 SERPL-SCNC: 28 MMOL/L (ref 21–32)
CREAT SERPL-MCNC: 1.76 MG/DL (ref 0.7–1.3)
ERYTHROCYTE [DISTWIDTH] IN BLOOD BY AUTOMATED COUNT: 14.9 % (ref 11.5–14.5)
GLUCOSE SERPL-MCNC: 133 MG/DL (ref 65–100)
HCT VFR BLD AUTO: 36.1 % (ref 36.6–50.3)
HGB BLD-MCNC: 11.9 G/DL (ref 12.1–17)
INR PPP: 1.8 (ref 0.9–1.1)
MCH RBC QN AUTO: 29.6 PG (ref 26–34)
MCHC RBC AUTO-ENTMCNC: 33 G/DL (ref 30–36.5)
MCV RBC AUTO: 89.8 FL (ref 80–99)
NRBC # BLD: 0 K/UL (ref 0–0.01)
NRBC BLD-RTO: 0 PER 100 WBC
PLATELET # BLD AUTO: 276 K/UL (ref 150–400)
PMV BLD AUTO: 10.8 FL (ref 8.9–12.9)
POTASSIUM SERPL-SCNC: 3.3 MMOL/L (ref 3.5–5.1)
PROTHROMBIN TIME: 18.3 SEC (ref 9–11.1)
RBC # BLD AUTO: 4.02 M/UL (ref 4.1–5.7)
SODIUM SERPL-SCNC: 137 MMOL/L (ref 136–145)
WBC # BLD AUTO: 8.1 K/UL (ref 4.1–11.1)

## 2023-09-21 PROCEDURE — 6370000000 HC RX 637 (ALT 250 FOR IP): Performed by: HOSPITALIST

## 2023-09-21 PROCEDURE — 85027 COMPLETE CBC AUTOMATED: CPT

## 2023-09-21 PROCEDURE — 94761 N-INVAS EAR/PLS OXIMETRY MLT: CPT

## 2023-09-21 PROCEDURE — 80048 BASIC METABOLIC PNL TOTAL CA: CPT

## 2023-09-21 PROCEDURE — 6370000000 HC RX 637 (ALT 250 FOR IP)

## 2023-09-21 PROCEDURE — 36415 COLL VENOUS BLD VENIPUNCTURE: CPT

## 2023-09-21 PROCEDURE — 6370000000 HC RX 637 (ALT 250 FOR IP): Performed by: SPECIALIST

## 2023-09-21 PROCEDURE — 6370000000 HC RX 637 (ALT 250 FOR IP): Performed by: INTERNAL MEDICINE

## 2023-09-21 PROCEDURE — 85610 PROTHROMBIN TIME: CPT

## 2023-09-21 PROCEDURE — 2580000003 HC RX 258: Performed by: HOSPITALIST

## 2023-09-21 PROCEDURE — 6360000002 HC RX W HCPCS: Performed by: INTERNAL MEDICINE

## 2023-09-21 RX ORDER — ISOSORBIDE MONONITRATE 30 MG/1
30 TABLET, EXTENDED RELEASE ORAL DAILY
Qty: 30 TABLET | Refills: 3 | Status: SHIPPED | OUTPATIENT
Start: 2023-09-22

## 2023-09-21 RX ORDER — ISOSORBIDE MONONITRATE 30 MG/1
30 TABLET, EXTENDED RELEASE ORAL DAILY
Status: DISCONTINUED | OUTPATIENT
Start: 2023-09-22 | End: 2023-09-21 | Stop reason: HOSPADM

## 2023-09-21 RX ORDER — WARFARIN SODIUM 1 MG/1
3 TABLET ORAL
Status: COMPLETED | OUTPATIENT
Start: 2023-09-21 | End: 2023-09-21

## 2023-09-21 RX ORDER — WARFARIN SODIUM 1 MG/1
3 TABLET ORAL
Status: DISCONTINUED | OUTPATIENT
Start: 2023-09-21 | End: 2023-09-21

## 2023-09-21 RX ORDER — POTASSIUM CHLORIDE 750 MG/1
30 TABLET, FILM COATED, EXTENDED RELEASE ORAL ONCE
Status: COMPLETED | OUTPATIENT
Start: 2023-09-21 | End: 2023-09-21

## 2023-09-21 RX ORDER — POTASSIUM CHLORIDE 750 MG/1
40 TABLET, FILM COATED, EXTENDED RELEASE ORAL ONCE
Status: COMPLETED | OUTPATIENT
Start: 2023-09-21 | End: 2023-09-21

## 2023-09-21 RX ORDER — NITROGLYCERIN 0.4 MG/1
0.4 TABLET SUBLINGUAL EVERY 5 MIN PRN
Qty: 25 TABLET | Refills: 3 | Status: SHIPPED | OUTPATIENT
Start: 2023-09-21

## 2023-09-21 RX ADMIN — OXYBUTYNIN CHLORIDE 5 MG: 5 TABLET ORAL at 09:21

## 2023-09-21 RX ADMIN — POTASSIUM CHLORIDE 30 MEQ: 750 TABLET, FILM COATED, EXTENDED RELEASE ORAL at 06:29

## 2023-09-21 RX ADMIN — ACETAMINOPHEN 650 MG: 325 TABLET ORAL at 09:22

## 2023-09-21 RX ADMIN — SUCRALFATE 1 G: 1 TABLET ORAL at 09:21

## 2023-09-21 RX ADMIN — METOPROLOL TARTRATE 12.5 MG: 25 TABLET, FILM COATED ORAL at 09:21

## 2023-09-21 RX ADMIN — PANTOPRAZOLE SODIUM 40 MG: 40 TABLET, DELAYED RELEASE ORAL at 06:29

## 2023-09-21 RX ADMIN — CLOPIDOGREL BISULFATE 75 MG: 75 TABLET ORAL at 09:21

## 2023-09-21 RX ADMIN — ENOXAPARIN SODIUM 70 MG: 100 INJECTION SUBCUTANEOUS at 09:20

## 2023-09-21 RX ADMIN — ROSUVASTATIN CALCIUM 20 MG: 10 TABLET, COATED ORAL at 09:21

## 2023-09-21 RX ADMIN — SODIUM CHLORIDE, PRESERVATIVE FREE 10 ML: 5 INJECTION INTRAVENOUS at 09:25

## 2023-09-21 RX ADMIN — SODIUM CHLORIDE, PRESERVATIVE FREE 10 ML: 5 INJECTION INTRAVENOUS at 06:31

## 2023-09-21 RX ADMIN — ISOSORBIDE MONONITRATE 15 MG: 30 TABLET, EXTENDED RELEASE ORAL at 09:21

## 2023-09-21 RX ADMIN — WARFARIN SODIUM 3 MG: 1 TABLET ORAL at 15:03

## 2023-09-21 RX ADMIN — POTASSIUM CHLORIDE 40 MEQ: 750 TABLET, FILM COATED, EXTENDED RELEASE ORAL at 09:20

## 2023-09-21 ASSESSMENT — PAIN SCALES - GENERAL
PAINLEVEL_OUTOF10: 3
PAINLEVEL_OUTOF10: 0

## 2023-09-21 ASSESSMENT — PAIN DESCRIPTION - ORIENTATION: ORIENTATION: ANTERIOR

## 2023-09-21 ASSESSMENT — PAIN DESCRIPTION - LOCATION: LOCATION: GENERALIZED

## 2023-09-21 ASSESSMENT — PAIN DESCRIPTION - DESCRIPTORS: DESCRIPTORS: ACHING

## 2023-09-21 NOTE — PLAN OF CARE
Problem: Discharge Planning  Goal: Discharge to home or other facility with appropriate resources  9/21/2023 0815 by Jimmie Mead RN  Outcome: Progressing  9/20/2023 2346 by Elian Tyler RN  Outcome: Progressing     Problem: Safety - Adult  Goal: Free from fall injury  9/21/2023 0815 by Jimmie Mead RN  Outcome: Progressing  9/20/2023 2346 by Elian Tyler RN  Outcome: Progressing     Problem: ABCDS Injury Assessment  Goal: Absence of physical injury  9/21/2023 0815 by Jimmie Meda RN  Outcome: Progressing  9/20/2023 2346 by Elian Tyler RN  Outcome: Progressing     Problem: Pain  Goal: Verbalizes/displays adequate comfort level or baseline comfort level  9/21/2023 0815 by Jimmie Mead RN  Outcome: Progressing  9/20/2023 2346 by Elian Tyler RN  Outcome: Progressing     Problem: Skin/Tissue Integrity  Goal: Absence of new skin breakdown  Description: 1. Monitor for areas of redness and/or skin breakdown  2. Assess vascular access sites hourly  3. Every 4-6 hours minimum:  Change oxygen saturation probe site  4. Every 4-6 hours:  If on nasal continuous positive airway pressure, respiratory therapy assess nares and determine need for appliance change or resting period.   Outcome: Progressing

## 2023-09-21 NOTE — DISCHARGE INSTRUCTIONS
HOSPITALIST DISCHARGE INSTRUCTIONS  NAME:  Claudetta Buff   :  1935   MRN:  [id number]     Date/Time:  2023 2:06 PM    ADMIT DATE: 2023     DISCHARGE DATE: 2023     DISCHARGE DIAGNOSIS:  NSTEMI    DISCHARGE INSTRUCTIONS:  Thank you for allowing us to participate in your care. Your discharging Hospitalist is Adelaida Godoy DO. You were admitted for evaluation and treatment of the above. - Since admission cardiology advised that you use imdur going forward. NO LISINOPRIL. NO VERAPAMIL      MEDICATIONS:    It is important that you take the medication exactly as they are prescribed. Keep your medication in the bottles provided by the pharmacist and keep a list of the medication names, dosages, and times to be taken in your wallet. Do not take other medications without consulting your doctor. If you experience any of the following symptoms then please call your primary care physician or return to the emergency room if you cannot get hold of your doctor:  Fever, chills, nausea, vomiting, diarrhea, change in mentation, falling, bleeding, shortness of breath    Follow Up:  Please call the below provider to arrange hospital follow up appointment      Birtha Kawasaki, MD  [address] [place] [address],[address]  [phone]    Follow up in 1 week(s)      Fany Alvarado  [address] [place] [address]  [phone]    Follow up  1-2 weeks      For questions regarding your Hospitalization or to contact the Hospital Medicine team, please call [phone]. Information obtained by :  I understand that if any problems occur once I am at home I am to contact my physician. I understand and acknowledge receipt of the instructions indicated above.                                                                                                                                            Physician's or R.N.'s Signature Date/Time                                                                                                                                              Patient or Representative Signature                                                          Date/Time

## 2023-09-21 NOTE — PROGRESS NOTES
[facility] Pharmacy Dosing Services: 09/21/23  Consult for Warfarin Dosing by Pharmacy by Dr. Chan Ann provided for this 82 yo male for indication of Afib  Day of Therapy: resumed from home. (PTA: Warfarin 3mg daily)  Dose to achieve an INR goal of 2-3    INR 1.8, trending up appropriately. Will continue home dose 3 mg.  If discharge delayed will plan to recommend discontinuing the enoxaparin bridge tomorrow AM.     Significant drug interactions: Lovenox bridge until INR >2 x2 days (NSTEMI)  Previous dose  3 mg 9/20   PT/INR Lab Results   Component Value Date/Time    INR 1.8 09/21/2023 03:21 AM      Platelets Lab Results   Component Value Date/Time     09/21/2023 03:21 AM      H/H Lab Results   Component Value Date/Time    HGB 11.9 09/21/2023 03:21 AM        Thanks,   Virgilio Leslei, PharmD

## 2023-09-21 NOTE — DISCHARGE SUMMARY
Hospitalist Discharge Summary     Patient ID:  Netta Bridges  [id number]  20 y.o.  1935    Admit date: 9/19/2023    Discharge date and time: 9/21/2023    Admission Diagnoses: Acute pulmonary edema (HCC) [J81.0]  SOB (shortness of breath) [R06.02]  Chest pain, unspecified type [R07.9]    Discharge Diagnoses:    Principal Problem:    Acute pulmonary edema (HCC)  Active Problems:    NSTEMI (non-ST elevated myocardial infarction) ([address])  Resolved Problems:    * No resolved hospital problems. *       Hospital Course:    81 y.o. male who presented with chest pain. Patient is alert awake oriented x3. Patient has a history of coronary artery disease and he was recently admitted to this hospital with an NSTEMI and had a cardiac catheter done on 9/12/2023. Patient was found to have high risk PCI of ostial proximal LAD and patient was recommended medical management after discussing various options regarding CABG, PCI versus medical management. Patient is DNR and he does not want any CABG or PCI at this time. Patient started having chest pain around 7 AM today. It was located in the anterior chest.  It was 9 out of 10 in intensity. Patient was not short of breath as well so EMS was called. They gave him nitro after that his chest pain resolved. He denies any chest pain at this time. His blood work showed slightly elevated troponin of 167 and his proBNP is 2272. He also has a potassium of 3.2. Patient has a history of atrial fibrillation and is on metoprolol and Coumadin. He was taking verapamil in the past.  He was on Lasix in the past but it was stopped during last admission. His CT scan showed mild pulmonary interstitial edema. - since admission pt treated for nstemi. Cariology saw patient and added long standing nitrate imdur. Patient pain completely resolved. Did not go back for cath given recent cath with severe ost/prox LAD stenosis. Cardiology advised to continue gdmt, and added imdur.  Pt stable for dc. Asks if he should take verapamil or lisinopril. The answer is no, which was dicussed w him. - since admission pt seen by cardiology    Imaging  CT CHEST WO CONTRAST    Result Date: 9/19/2023  1. Scattered areas of interlobular septal thickening in the lungs favored to represent mild pulmonary interstitial edema. 2. Bilateral lower lobe subsegmental atelectasis. 3. Additional findings as above. XR CHEST PORTABLE    Result Date: 9/19/2023  No acute finding. PCP: Rayna Schaumann. Consults: cardiology    Condition of patient at discharge: Improved    Discharge Exam:    Physical Exam:    Gen: Well-developed, well-nourished, in no acute distress  HEENT:  Pink conjunctivae, PERRL, hearing intact to voice, moist mucous membranes  Neck: Supple, without masses, thyroid non-tender  Resp: No accessory muscle use, clear breath sounds without wheezes rales or rhonchi  Card: No murmurs, normal S1, S2 without thrills, bruits or peripheral edema  Abd:  Soft, non-tender, non-distended, normoactive bowel sounds are present, no palpable organomegaly and no detectable hernias  Lymph:  No cervical or inguinal adenopathy  Musc: No cyanosis or clubbing  Skin: No rashes or ulcers, skin turgor is good  Neuro:  Cranial nerves are grossly intact, no focal motor weakness, follows commands appropriately  Psych:  Good insight, oriented to person, place and time, alert          Disposition: home    Patient Instructions:   Current Discharge Medication List        START taking these medications    Details   isosorbide mononitrate (IMDUR) 30 MG extended release tablet Take 1 tablet by mouth daily  Qty: 30 tablet, Refills: 3      nitroGLYCERIN (NITROSTAT) 0.4 MG SL tablet Place 1 tablet under the tongue every 5 minutes as needed for Chest pain up to max of 3 total doses. If no relief after 1 dose, call 911.   Qty: 25 tablet, Refills: 3           CONTINUE these medications which have NOT CHANGED    Details   rosuvastatin (CRESTOR)

## 2023-09-26 NOTE — PROGRESS NOTES
Physician Progress Note      PATIENTRandine Early  CSN #:                  [id number]  :                       1935  ADMIT DATE:       2023 9:25 AM  DISCH DATE:        2023 4:05 PM  RESPONDING  PROVIDER #:        Sophie Villa DO          QUERY TEXT:    Good afternoon  Patient admitted with Chest pain, noted to have atrial fibrillation and is   maintained on warfarin. If possible, please document in progress notes and discharge summary if you   are evaluating and/or treating any of the following: The medical record reflects the following:  Risk Factors: CAD, Age  Clinical Indicators: Per H&P \"Atrial fibrillation-his heart rate is in the 40s   to 50s\"  Treatment: daily labs, EKG, cardiology consult, PO warfarin    Thank you  Dayron Hinojosa RN CDS  [phone]  Options provided:  -- Secondary hypercoagulable state in a patient with atrial fibrillation  -- Secondary hypercoagulable state in a patient with atrial fibrillation not   present  -- Other - I will add my own diagnosis  -- Disagree - Not applicable / Not valid  -- Disagree - Clinically unable to determine / Unknown  -- Refer to Clinical Documentation Reviewer    PROVIDER RESPONSE TEXT:    This patient has secondary hypercoagulable state in a patient with atrial   fibrillation.     Query created by: Dayron Hinojosa on 2023 7:51 AM      Electronically signed by:  Sophie Villa DO 2023 6:15 AM

## 2023-10-04 ENCOUNTER — HOSPITAL ENCOUNTER (INPATIENT)
Facility: HOSPITAL | Age: 88
LOS: 2 days | Discharge: HOME | End: 2023-10-06
Attending: EMERGENCY MEDICINE | Admitting: INTERNAL MEDICINE
Payer: MEDICARE

## 2023-10-04 ENCOUNTER — APPOINTMENT (OUTPATIENT)
Facility: HOSPITAL | Age: 88
End: 2023-10-04
Payer: MEDICARE

## 2023-10-04 DIAGNOSIS — R07.9 CHEST PAIN, UNSPECIFIED TYPE: Primary | ICD-10-CM

## 2023-10-04 DIAGNOSIS — I21.4 NSTEMI (NON-ST ELEVATED MYOCARDIAL INFARCTION) (HCC): ICD-10-CM

## 2023-10-04 DIAGNOSIS — I25.118 CORONARY ARTERY DISEASE OF NATIVE HEART WITH STABLE ANGINA PECTORIS, UNSPECIFIED VESSEL OR LESION TYPE (HCC): ICD-10-CM

## 2023-10-04 DIAGNOSIS — R94.31 ACUTE ELECTROCARDIOGRAM CHANGES: ICD-10-CM

## 2023-10-04 PROBLEM — I25.10 CAD (CORONARY ARTERY DISEASE): Status: ACTIVE | Noted: 2023-10-04

## 2023-10-04 PROBLEM — I48.20 CHRONIC A-FIB (HCC): Chronic | Status: ACTIVE | Noted: 2023-01-01

## 2023-10-04 PROBLEM — I20.89 ANGINA AT REST: Status: ACTIVE | Noted: 2023-10-04

## 2023-10-04 LAB
ALBUMIN SERPL-MCNC: 3.4 G/DL (ref 3.5–5)
ALBUMIN/GLOB SERPL: 0.8 (ref 1.1–2.2)
ALP SERPL-CCNC: 61 U/L (ref 45–117)
ALT SERPL-CCNC: 20 U/L (ref 12–78)
ANION GAP SERPL CALC-SCNC: 10 MMOL/L (ref 5–15)
AST SERPL-CCNC: 27 U/L (ref 15–37)
BASOPHILS # BLD: 0 K/UL (ref 0–0.1)
BASOPHILS NFR BLD: 1 % (ref 0–1)
BILIRUB SERPL-MCNC: 1.5 MG/DL (ref 0.2–1)
BUN SERPL-MCNC: 16 MG/DL (ref 6–20)
BUN/CREAT SERPL: 11 (ref 12–20)
CALCIUM SERPL-MCNC: 8.6 MG/DL (ref 8.5–10.1)
CHLORIDE SERPL-SCNC: 108 MMOL/L (ref 97–108)
CO2 SERPL-SCNC: 21 MMOL/L (ref 21–32)
COMMENT:: NORMAL
CREAT SERPL-MCNC: 1.48 MG/DL (ref 0.7–1.3)
DIFFERENTIAL METHOD BLD: ABNORMAL
EOSINOPHIL # BLD: 0.1 K/UL (ref 0–0.4)
EOSINOPHIL NFR BLD: 1 % (ref 0–7)
ERYTHROCYTE [DISTWIDTH] IN BLOOD BY AUTOMATED COUNT: 15.8 % (ref 11.5–14.5)
GLOBULIN SER CALC-MCNC: 4.3 G/DL (ref 2–4)
GLUCOSE SERPL-MCNC: 90 MG/DL (ref 65–100)
HCT VFR BLD AUTO: 37.7 % (ref 36.6–50.3)
HGB BLD-MCNC: 12.2 G/DL (ref 12.1–17)
IMM GRANULOCYTES # BLD AUTO: 0 K/UL (ref 0–0.04)
IMM GRANULOCYTES NFR BLD AUTO: 0 % (ref 0–0.5)
INR PPP: 1.7 (ref 0.9–1.1)
LIPASE SERPL-CCNC: 33 U/L (ref 13–75)
LYMPHOCYTES # BLD: 1.1 K/UL (ref 0.8–3.5)
LYMPHOCYTES NFR BLD: 14 % (ref 12–49)
MAGNESIUM SERPL-MCNC: 2 MG/DL (ref 1.6–2.4)
MCH RBC QN AUTO: 29.7 PG (ref 26–34)
MCHC RBC AUTO-ENTMCNC: 32.4 G/DL (ref 30–36.5)
MCV RBC AUTO: 91.7 FL (ref 80–99)
MONOCYTES # BLD: 0.4 K/UL (ref 0–1)
MONOCYTES NFR BLD: 5 % (ref 5–13)
NEUTS SEG # BLD: 6.6 K/UL (ref 1.8–8)
NEUTS SEG NFR BLD: 79 % (ref 32–75)
NRBC # BLD: 0 K/UL (ref 0–0.01)
NRBC BLD-RTO: 0 PER 100 WBC
NT PRO BNP: 3240 PG/ML
PLATELET # BLD AUTO: 275 K/UL (ref 150–400)
PMV BLD AUTO: 10.5 FL (ref 8.9–12.9)
POTASSIUM SERPL-SCNC: 4.2 MMOL/L (ref 3.5–5.1)
PROT SERPL-MCNC: 7.7 G/DL (ref 6.4–8.2)
PROTHROMBIN TIME: 17.5 SEC (ref 9–11.1)
RBC # BLD AUTO: 4.11 M/UL (ref 4.1–5.7)
SODIUM SERPL-SCNC: 139 MMOL/L (ref 136–145)
SPECIMEN HOLD: NORMAL
TROPONIN I SERPL HS-MCNC: 58 NG/L (ref 0–76)
WBC # BLD AUTO: 8.2 K/UL (ref 4.1–11.1)

## 2023-10-04 PROCEDURE — 2580000003 HC RX 258: Performed by: INTERNAL MEDICINE

## 2023-10-04 PROCEDURE — 83880 ASSAY OF NATRIURETIC PEPTIDE: CPT

## 2023-10-04 PROCEDURE — 71045 X-RAY EXAM CHEST 1 VIEW: CPT

## 2023-10-04 PROCEDURE — 85610 PROTHROMBIN TIME: CPT

## 2023-10-04 PROCEDURE — 99285 EMERGENCY DEPT VISIT HI MDM: CPT

## 2023-10-04 PROCEDURE — 83690 ASSAY OF LIPASE: CPT

## 2023-10-04 PROCEDURE — 96365 THER/PROPH/DIAG IV INF INIT: CPT

## 2023-10-04 PROCEDURE — 36415 COLL VENOUS BLD VENIPUNCTURE: CPT

## 2023-10-04 PROCEDURE — 83735 ASSAY OF MAGNESIUM: CPT

## 2023-10-04 PROCEDURE — 84484 ASSAY OF TROPONIN QUANT: CPT

## 2023-10-04 PROCEDURE — 96375 TX/PRO/DX INJ NEW DRUG ADDON: CPT

## 2023-10-04 PROCEDURE — 6360000002 HC RX W HCPCS: Performed by: INTERNAL MEDICINE

## 2023-10-04 PROCEDURE — 6360000002 HC RX W HCPCS: Performed by: EMERGENCY MEDICINE

## 2023-10-04 PROCEDURE — 94761 N-INVAS EAR/PLS OXIMETRY MLT: CPT

## 2023-10-04 PROCEDURE — 1100000000 HC RM PRIVATE

## 2023-10-04 PROCEDURE — 85025 COMPLETE CBC W/AUTO DIFF WBC: CPT

## 2023-10-04 PROCEDURE — 6370000000 HC RX 637 (ALT 250 FOR IP): Performed by: INTERNAL MEDICINE

## 2023-10-04 PROCEDURE — 80053 COMPREHEN METABOLIC PANEL: CPT

## 2023-10-04 PROCEDURE — 96374 THER/PROPH/DIAG INJ IV PUSH: CPT

## 2023-10-04 PROCEDURE — 93005 ELECTROCARDIOGRAM TRACING: CPT | Performed by: EMERGENCY MEDICINE

## 2023-10-04 RX ORDER — ONDANSETRON 2 MG/ML
4 INJECTION INTRAMUSCULAR; INTRAVENOUS EVERY 6 HOURS PRN
Status: DISCONTINUED | OUTPATIENT
Start: 2023-10-04 | End: 2023-10-06 | Stop reason: HOSPADM

## 2023-10-04 RX ORDER — ROSUVASTATIN CALCIUM 10 MG/1
20 TABLET, COATED ORAL DAILY
Status: DISCONTINUED | OUTPATIENT
Start: 2023-10-05 | End: 2023-10-06 | Stop reason: HOSPADM

## 2023-10-04 RX ORDER — POLYETHYLENE GLYCOL 3350 17 G/17G
17 POWDER, FOR SOLUTION ORAL DAILY PRN
Status: DISCONTINUED | OUTPATIENT
Start: 2023-10-04 | End: 2023-10-06 | Stop reason: HOSPADM

## 2023-10-04 RX ORDER — SODIUM CHLORIDE 0.9 % (FLUSH) 0.9 %
5-40 SYRINGE (ML) INJECTION PRN
Status: DISCONTINUED | OUTPATIENT
Start: 2023-10-04 | End: 2023-10-06 | Stop reason: HOSPADM

## 2023-10-04 RX ORDER — OXYBUTYNIN CHLORIDE 5 MG/1
5 TABLET ORAL 2 TIMES DAILY
Status: DISCONTINUED | OUTPATIENT
Start: 2023-10-04 | End: 2023-10-06 | Stop reason: HOSPADM

## 2023-10-04 RX ORDER — NITROGLYCERIN 0.4 MG/1
0.4 TABLET SUBLINGUAL EVERY 5 MIN PRN
Status: DISCONTINUED | OUTPATIENT
Start: 2023-10-04 | End: 2023-10-06 | Stop reason: HOSPADM

## 2023-10-04 RX ORDER — ONDANSETRON 2 MG/ML
4 INJECTION INTRAMUSCULAR; INTRAVENOUS ONCE
Status: COMPLETED | OUTPATIENT
Start: 2023-10-04 | End: 2023-10-04

## 2023-10-04 RX ORDER — MORPHINE SULFATE 2 MG/ML
2 INJECTION, SOLUTION INTRAMUSCULAR; INTRAVENOUS EVERY 4 HOURS PRN
Status: DISCONTINUED | OUTPATIENT
Start: 2023-10-04 | End: 2023-10-05

## 2023-10-04 RX ORDER — MORPHINE SULFATE 2 MG/ML
2 INJECTION, SOLUTION INTRAMUSCULAR; INTRAVENOUS
Status: COMPLETED | OUTPATIENT
Start: 2023-10-04 | End: 2023-10-04

## 2023-10-04 RX ORDER — ISOSORBIDE MONONITRATE 30 MG/1
30 TABLET, EXTENDED RELEASE ORAL DAILY
Status: DISCONTINUED | OUTPATIENT
Start: 2023-10-05 | End: 2023-10-05

## 2023-10-04 RX ORDER — SODIUM CHLORIDE 9 MG/ML
INJECTION, SOLUTION INTRAVENOUS PRN
Status: DISCONTINUED | OUTPATIENT
Start: 2023-10-04 | End: 2023-10-06 | Stop reason: HOSPADM

## 2023-10-04 RX ORDER — PANTOPRAZOLE SODIUM 40 MG/1
40 TABLET, DELAYED RELEASE ORAL
Status: DISCONTINUED | OUTPATIENT
Start: 2023-10-05 | End: 2023-10-06 | Stop reason: HOSPADM

## 2023-10-04 RX ORDER — SODIUM CHLORIDE 0.9 % (FLUSH) 0.9 %
5-40 SYRINGE (ML) INJECTION EVERY 12 HOURS SCHEDULED
Status: DISCONTINUED | OUTPATIENT
Start: 2023-10-04 | End: 2023-10-06 | Stop reason: HOSPADM

## 2023-10-04 RX ORDER — NITROGLYCERIN 20 MG/100ML
5-200 INJECTION INTRAVENOUS CONTINUOUS
Status: DISCONTINUED | OUTPATIENT
Start: 2023-10-04 | End: 2023-10-05

## 2023-10-04 RX ORDER — SODIUM CHLORIDE 9 MG/ML
INJECTION, SOLUTION INTRAVENOUS CONTINUOUS
Status: DISCONTINUED | OUTPATIENT
Start: 2023-10-04 | End: 2023-10-06 | Stop reason: HOSPADM

## 2023-10-04 RX ORDER — ACETAMINOPHEN 325 MG/1
650 TABLET ORAL EVERY 6 HOURS PRN
Status: DISCONTINUED | OUTPATIENT
Start: 2023-10-04 | End: 2023-10-06 | Stop reason: HOSPADM

## 2023-10-04 RX ORDER — CLOPIDOGREL BISULFATE 75 MG/1
75 TABLET ORAL DAILY
Status: DISCONTINUED | OUTPATIENT
Start: 2023-10-05 | End: 2023-10-06 | Stop reason: HOSPADM

## 2023-10-04 RX ORDER — ACETAMINOPHEN 650 MG/1
650 SUPPOSITORY RECTAL EVERY 6 HOURS PRN
Status: DISCONTINUED | OUTPATIENT
Start: 2023-10-04 | End: 2023-10-06 | Stop reason: HOSPADM

## 2023-10-04 RX ORDER — PROCHLORPERAZINE EDISYLATE 5 MG/ML
10 INJECTION INTRAMUSCULAR; INTRAVENOUS EVERY 6 HOURS PRN
Status: DISCONTINUED | OUTPATIENT
Start: 2023-10-04 | End: 2023-10-06 | Stop reason: HOSPADM

## 2023-10-04 RX ORDER — SENNA AND DOCUSATE SODIUM 50; 8.6 MG/1; MG/1
1 TABLET, FILM COATED ORAL 2 TIMES DAILY
Status: DISCONTINUED | OUTPATIENT
Start: 2023-10-04 | End: 2023-10-06 | Stop reason: HOSPADM

## 2023-10-04 RX ADMIN — NITROGLYCERIN 20 MCG/MIN: 20 INJECTION INTRAVENOUS at 20:06

## 2023-10-04 RX ADMIN — METOPROLOL TARTRATE 12.5 MG: 25 TABLET, FILM COATED ORAL at 22:52

## 2023-10-04 RX ADMIN — OXYBUTYNIN CHLORIDE 5 MG: 5 TABLET ORAL at 22:52

## 2023-10-04 RX ADMIN — SODIUM CHLORIDE, PRESERVATIVE FREE 10 ML: 5 INJECTION INTRAVENOUS at 22:13

## 2023-10-04 RX ADMIN — MORPHINE SULFATE 2 MG: 2 INJECTION, SOLUTION INTRAMUSCULAR; INTRAVENOUS at 20:09

## 2023-10-04 RX ADMIN — SODIUM CHLORIDE: 9 INJECTION, SOLUTION INTRAVENOUS at 22:09

## 2023-10-04 RX ADMIN — ONDANSETRON 4 MG: 2 INJECTION INTRAMUSCULAR; INTRAVENOUS at 20:29

## 2023-10-04 ASSESSMENT — PAIN SCALES - GENERAL
PAINLEVEL_OUTOF10: 1
PAINLEVEL_OUTOF10: 0
PAINLEVEL_OUTOF10: 5
PAINLEVEL_OUTOF10: 8
PAINLEVEL_OUTOF10: 0

## 2023-10-04 ASSESSMENT — ENCOUNTER SYMPTOMS
CONSTIPATION: 0
SHORTNESS OF BREATH: 0
SORE THROAT: 0

## 2023-10-04 ASSESSMENT — PAIN - FUNCTIONAL ASSESSMENT: PAIN_FUNCTIONAL_ASSESSMENT: 0-10

## 2023-10-04 NOTE — ED TRIAGE NOTES
Pt arrives via EMS reporting mid anterior chest pain with chest tightness contributing to shortness of breath. Pt also reports both arms feel heavy. Pt self administered SL nitro at 1730, 1800, and 1830. EMS advises they gave one around [phone] all three without relief.

## 2023-10-04 NOTE — ED PROVIDER NOTES
[facility] EMERGENCY DEPT  EMERGENCY DEPARTMENT ENCOUNTER      Pt Name: J Luis Peters  MRN: [id number]  [address] [facility] 1935  Date of evaluation: 10/4/2023  Provider: Reji Varela MD    CHIEF COMPLAINT       Chief Complaint   Patient presents with    Chest Pain         HISTORY OF PRESENT ILLNESS   (Location/Symptom, Timing/Onset, Context/Setting, Quality, Duration, Modifying Factors, Severity)  Note limiting factors. 73-year-old male presents from home via EMS with a complaint of chest pain. Symptoms started this evening around 6:00. States he took 3 sublingual nitroglycerin with no relief of his symptoms. He denies any cough, fever, vomiting, headaches. Patient was discharged from the hospital a couple weeks ago after being treated for an NSTEMI. He had a cath 3 weeks ago that showed severe proximal LAD stenosis. Decision was made to pursue medical management with Coumadin and Plavix. He was discharged home on September 14 on Imdur to help with symptoms as well as nitroglycerin for chest pain. The history is provided by the patient, the EMS personnel and medical records. Review of External Medical Records:     Nursing Notes were reviewed. REVIEW OF SYSTEMS    (2-9 systems for level 4, 10 or more for level 5)     Review of Systems   Constitutional:  Negative for fatigue. HENT:  Negative for sore throat. Eyes:  Negative for visual disturbance. Respiratory:  Negative for shortness of breath. Cardiovascular:  Positive for chest pain. Negative for palpitations. Gastrointestinal:  Negative for constipation. Genitourinary:  Negative for difficulty urinating. Musculoskeletal:  Negative for myalgias. Skin:  Negative for rash. Except as noted above the remainder of the review of systems was reviewed and negative.        PAST MEDICAL HISTORY     Past Medical History:   Diagnosis Date    Atrial fibrillation [facility])          SURGICAL HISTORY       Past Surgical History:   Procedure sublingual ntg. Pain free now on ntg drip. Trop down from 5,000 to 50. ECG shows some ST depressions lateral leads. Pt on coumadin. Total critical care time spent exclusive of procedures:  35 minutes. PATIENT REFERRED TO:  No follow-up provider specified.     DISCHARGE MEDICATIONS:  New Prescriptions    No medications on file         (Please note that portions of this note were completed with a voice recognition program.  Efforts were made to edit the dictations but occasionally words are mis-transcribed.)    Miguel Alexis MD (electronically signed)  Emergency Attending Physician / Physician Assistant / Nurse Practitioner           Khoi Suggs MD  10/04/23 3254

## 2023-10-05 ENCOUNTER — APPOINTMENT (OUTPATIENT)
Facility: HOSPITAL | Age: 88
End: 2023-10-05
Payer: MEDICARE

## 2023-10-05 PROBLEM — K21.9 GERD (GASTROESOPHAGEAL REFLUX DISEASE): Status: ACTIVE | Noted: 2023-10-05

## 2023-10-05 PROBLEM — N18.30 CKD (CHRONIC KIDNEY DISEASE) STAGE 3, GFR 30-59 ML/MIN (HCC): Status: ACTIVE | Noted: 2023-01-01

## 2023-10-05 PROBLEM — R07.9 CHEST PAIN: Status: ACTIVE | Noted: 2023-10-04

## 2023-10-05 PROBLEM — E78.5 HYPERLIPIDEMIA: Status: ACTIVE | Noted: 2023-01-01

## 2023-10-05 PROBLEM — E88.09 HYPOALBUMINEMIA: Status: ACTIVE | Noted: 2023-01-01

## 2023-10-05 PROBLEM — J81.0 ACUTE PULMONARY EDEMA (HCC): Status: RESOLVED | Noted: 2023-09-19 | Resolved: 2023-10-05

## 2023-10-05 PROBLEM — D64.9 ANEMIA: Status: ACTIVE | Noted: 2023-10-05

## 2023-10-05 PROBLEM — Z79.01 CHRONIC ANTICOAGULATION: Status: ACTIVE | Noted: 2023-10-05

## 2023-10-05 PROBLEM — J96.01 ACUTE RESPIRATORY FAILURE WITH HYPOXIA (HCC): Status: RESOLVED | Noted: 2023-09-10 | Resolved: 2023-10-05

## 2023-10-05 PROBLEM — N32.89 BLADDER SPASMS: Status: ACTIVE | Noted: 2023-10-05

## 2023-10-05 LAB
ALBUMIN SERPL-MCNC: 3 G/DL (ref 3.5–5)
ALBUMIN/GLOB SERPL: 0.7 (ref 1.1–2.2)
ALP SERPL-CCNC: 58 U/L (ref 45–117)
ALT SERPL-CCNC: 21 U/L (ref 12–78)
ANION GAP SERPL CALC-SCNC: 5 MMOL/L (ref 5–15)
APTT PPP: 31.5 SEC (ref 22.1–31)
AST SERPL-CCNC: 66 U/L (ref 15–37)
BASOPHILS # BLD: 0 K/UL (ref 0–0.1)
BASOPHILS NFR BLD: 0 % (ref 0–1)
BILIRUB SERPL-MCNC: 1.4 MG/DL (ref 0.2–1)
BUN SERPL-MCNC: 15 MG/DL (ref 6–20)
BUN/CREAT SERPL: 11 (ref 12–20)
CALCIUM SERPL-MCNC: 8.5 MG/DL (ref 8.5–10.1)
CHLORIDE SERPL-SCNC: 111 MMOL/L (ref 97–108)
CHOLEST SERPL-MCNC: 124 MG/DL
CO2 SERPL-SCNC: 23 MMOL/L (ref 21–32)
CREAT SERPL-MCNC: 1.34 MG/DL (ref 0.7–1.3)
CRP SERPL-MCNC: 1.53 MG/DL (ref 0–0.6)
DIFFERENTIAL METHOD BLD: ABNORMAL
EKG DIAGNOSIS: NORMAL
EKG Q-T INTERVAL: 380 MS
EKG QRS DURATION: 94 MS
EKG QTC CALCULATION (BAZETT): 464 MS
EKG R AXIS: -43 DEGREES
EKG T AXIS: 131 DEGREES
EKG VENTRICULAR RATE: 90 BPM
EOSINOPHIL # BLD: 0 K/UL (ref 0–0.4)
EOSINOPHIL NFR BLD: 0 % (ref 0–7)
ERYTHROCYTE [DISTWIDTH] IN BLOOD BY AUTOMATED COUNT: 15.7 % (ref 11.5–14.5)
EST. AVERAGE GLUCOSE BLD GHB EST-MCNC: 114 MG/DL
GLOBULIN SER CALC-MCNC: 4.2 G/DL (ref 2–4)
GLUCOSE SERPL-MCNC: 74 MG/DL (ref 65–100)
HBA1C MFR BLD: 5.6 % (ref 4–5.6)
HCT VFR BLD AUTO: 37.6 % (ref 36.6–50.3)
HDLC SERPL-MCNC: 43 MG/DL
HDLC SERPL: 2.9 (ref 0–5)
HGB BLD-MCNC: 11.8 G/DL (ref 12.1–17)
IMM GRANULOCYTES # BLD AUTO: 0 K/UL (ref 0–0.04)
IMM GRANULOCYTES NFR BLD AUTO: 0 % (ref 0–0.5)
INR PPP: 1.8 (ref 0.9–1.1)
INR PPP: 1.8 (ref 0.9–1.1)
LDLC SERPL CALC-MCNC: 68.8 MG/DL (ref 0–100)
LYMPHOCYTES # BLD: 1.4 K/UL (ref 0.8–3.5)
LYMPHOCYTES NFR BLD: 18 % (ref 12–49)
MAGNESIUM SERPL-MCNC: 2.2 MG/DL (ref 1.6–2.4)
MCH RBC QN AUTO: 29 PG (ref 26–34)
MCHC RBC AUTO-ENTMCNC: 31.4 G/DL (ref 30–36.5)
MCV RBC AUTO: 92.4 FL (ref 80–99)
MONOCYTES # BLD: 0.5 K/UL (ref 0–1)
MONOCYTES NFR BLD: 6 % (ref 5–13)
NEUTS SEG # BLD: 6 K/UL (ref 1.8–8)
NEUTS SEG NFR BLD: 76 % (ref 32–75)
NRBC # BLD: 0 K/UL (ref 0–0.01)
NRBC BLD-RTO: 0 PER 100 WBC
NT PRO BNP: 6671 PG/ML
PHOSPHATE SERPL-MCNC: 4.4 MG/DL (ref 2.6–4.7)
PLATELET # BLD AUTO: 257 K/UL (ref 150–400)
PMV BLD AUTO: 10.4 FL (ref 8.9–12.9)
POTASSIUM SERPL-SCNC: 4.9 MMOL/L (ref 3.5–5.1)
PROT SERPL-MCNC: 7.2 G/DL (ref 6.4–8.2)
PROTHROMBIN TIME: 18.2 SEC (ref 9–11.1)
PROTHROMBIN TIME: 18.2 SEC (ref 9–11.1)
RBC # BLD AUTO: 4.07 M/UL (ref 4.1–5.7)
SODIUM SERPL-SCNC: 139 MMOL/L (ref 136–145)
THERAPEUTIC RANGE: ABNORMAL SECS (ref 58–77)
TRIGL SERPL-MCNC: 61 MG/DL
TROPONIN I SERPL HS-MCNC: ABNORMAL NG/L (ref 0–76)
TROPONIN I SERPL HS-MCNC: ABNORMAL NG/L (ref 0–76)
TSH SERPL DL<=0.05 MIU/L-ACNC: 1.99 UIU/ML (ref 0.36–3.74)
UFH PPP CHRO-ACNC: <0.1 IU/ML
VLDLC SERPL CALC-MCNC: 12.2 MG/DL
WBC # BLD AUTO: 8 K/UL (ref 4.1–11.1)

## 2023-10-05 PROCEDURE — 6370000000 HC RX 637 (ALT 250 FOR IP): Performed by: NURSE PRACTITIONER

## 2023-10-05 PROCEDURE — 80053 COMPREHEN METABOLIC PANEL: CPT

## 2023-10-05 PROCEDURE — 84100 ASSAY OF PHOSPHORUS: CPT

## 2023-10-05 PROCEDURE — 85025 COMPLETE CBC W/AUTO DIFF WBC: CPT

## 2023-10-05 PROCEDURE — 6360000002 HC RX W HCPCS: Performed by: INTERNAL MEDICINE

## 2023-10-05 PROCEDURE — 1100000000 HC RM PRIVATE

## 2023-10-05 PROCEDURE — 94761 N-INVAS EAR/PLS OXIMETRY MLT: CPT

## 2023-10-05 PROCEDURE — 85610 PROTHROMBIN TIME: CPT

## 2023-10-05 PROCEDURE — 6370000000 HC RX 637 (ALT 250 FOR IP): Performed by: INTERNAL MEDICINE

## 2023-10-05 PROCEDURE — 85730 THROMBOPLASTIN TIME PARTIAL: CPT

## 2023-10-05 PROCEDURE — 36415 COLL VENOUS BLD VENIPUNCTURE: CPT

## 2023-10-05 PROCEDURE — APPSS45 APP SPLIT SHARED TIME 31-45 MINUTES: Performed by: NURSE PRACTITIONER

## 2023-10-05 PROCEDURE — 85520 HEPARIN ASSAY: CPT

## 2023-10-05 PROCEDURE — 99222 1ST HOSP IP/OBS MODERATE 55: CPT | Performed by: SPECIALIST

## 2023-10-05 PROCEDURE — 2500000003 HC RX 250 WO HCPCS: Performed by: NURSE PRACTITIONER

## 2023-10-05 PROCEDURE — 84484 ASSAY OF TROPONIN QUANT: CPT

## 2023-10-05 PROCEDURE — 80061 LIPID PANEL: CPT

## 2023-10-05 PROCEDURE — 93010 ELECTROCARDIOGRAM REPORT: CPT | Performed by: SPECIALIST

## 2023-10-05 PROCEDURE — 83735 ASSAY OF MAGNESIUM: CPT

## 2023-10-05 PROCEDURE — 86140 C-REACTIVE PROTEIN: CPT

## 2023-10-05 PROCEDURE — 83036 HEMOGLOBIN GLYCOSYLATED A1C: CPT

## 2023-10-05 PROCEDURE — 84443 ASSAY THYROID STIM HORMONE: CPT

## 2023-10-05 PROCEDURE — 2580000003 HC RX 258: Performed by: INTERNAL MEDICINE

## 2023-10-05 PROCEDURE — 99497 ADVNCD CARE PLAN 30 MIN: CPT | Performed by: STUDENT IN AN ORGANIZED HEALTH CARE EDUCATION/TRAINING PROGRAM

## 2023-10-05 PROCEDURE — 83880 ASSAY OF NATRIURETIC PEPTIDE: CPT

## 2023-10-05 PROCEDURE — 6360000002 HC RX W HCPCS: Performed by: STUDENT IN AN ORGANIZED HEALTH CARE EDUCATION/TRAINING PROGRAM

## 2023-10-05 PROCEDURE — 99223 1ST HOSP IP/OBS HIGH 75: CPT | Performed by: STUDENT IN AN ORGANIZED HEALTH CARE EDUCATION/TRAINING PROGRAM

## 2023-10-05 RX ORDER — HEPARIN SODIUM 5000 [USP'U]/ML
5000 INJECTION, SOLUTION INTRAVENOUS; SUBCUTANEOUS EVERY 8 HOURS SCHEDULED
Status: DISCONTINUED | OUTPATIENT
Start: 2023-10-05 | End: 2023-10-05

## 2023-10-05 RX ORDER — ISOSORBIDE MONONITRATE 30 MG/1
60 TABLET, EXTENDED RELEASE ORAL DAILY
Status: DISCONTINUED | OUTPATIENT
Start: 2023-10-05 | End: 2023-10-06 | Stop reason: HOSPADM

## 2023-10-05 RX ORDER — MORPHINE SULFATE 2 MG/ML
2 INJECTION, SOLUTION INTRAMUSCULAR; INTRAVENOUS EVERY 4 HOURS PRN
Status: DISCONTINUED | OUTPATIENT
Start: 2023-10-05 | End: 2023-10-06 | Stop reason: HOSPADM

## 2023-10-05 RX ORDER — HEPARIN SODIUM 5000 [USP'U]/ML
5000 INJECTION, SOLUTION INTRAVENOUS; SUBCUTANEOUS EVERY 8 HOURS
Status: DISCONTINUED | OUTPATIENT
Start: 2023-10-05 | End: 2023-10-06 | Stop reason: HOSPADM

## 2023-10-05 RX ORDER — WARFARIN SODIUM 2 MG/1
4 TABLET ORAL
Status: COMPLETED | OUTPATIENT
Start: 2023-10-05 | End: 2023-10-05

## 2023-10-05 RX ORDER — HEPARIN SODIUM 10000 [USP'U]/100ML
5-30 INJECTION, SOLUTION INTRAVENOUS CONTINUOUS
Status: DISCONTINUED | OUTPATIENT
Start: 2023-10-05 | End: 2023-10-05

## 2023-10-05 RX ORDER — HEPARIN SODIUM 1000 [USP'U]/ML
4000 INJECTION, SOLUTION INTRAVENOUS; SUBCUTANEOUS PRN
Status: DISCONTINUED | OUTPATIENT
Start: 2023-10-05 | End: 2023-10-05

## 2023-10-05 RX ORDER — HEPARIN SODIUM 1000 [USP'U]/ML
4000 INJECTION, SOLUTION INTRAVENOUS; SUBCUTANEOUS ONCE
Status: COMPLETED | OUTPATIENT
Start: 2023-10-05 | End: 2023-10-05

## 2023-10-05 RX ORDER — HEPARIN SODIUM 1000 [USP'U]/ML
2000 INJECTION, SOLUTION INTRAVENOUS; SUBCUTANEOUS PRN
Status: DISCONTINUED | OUTPATIENT
Start: 2023-10-05 | End: 2023-10-05

## 2023-10-05 RX ORDER — BUMETANIDE 0.25 MG/ML
1 INJECTION INTRAMUSCULAR; INTRAVENOUS ONCE
Status: COMPLETED | OUTPATIENT
Start: 2023-10-05 | End: 2023-10-05

## 2023-10-05 RX ADMIN — ACETAMINOPHEN 650 MG: 325 TABLET ORAL at 16:54

## 2023-10-05 RX ADMIN — ROSUVASTATIN CALCIUM 20 MG: 10 TABLET, COATED ORAL at 10:14

## 2023-10-05 RX ADMIN — METOPROLOL TARTRATE 12.5 MG: 25 TABLET, FILM COATED ORAL at 10:13

## 2023-10-05 RX ADMIN — DOCUSATE SODIUM 50MG AND SENNOSIDES 8.6MG 1 TABLET: 8.6; 5 TABLET, FILM COATED ORAL at 10:13

## 2023-10-05 RX ADMIN — OXYBUTYNIN CHLORIDE 5 MG: 5 TABLET ORAL at 10:14

## 2023-10-05 RX ADMIN — PANTOPRAZOLE SODIUM 40 MG: 40 TABLET, DELAYED RELEASE ORAL at 10:16

## 2023-10-05 RX ADMIN — SODIUM CHLORIDE, PRESERVATIVE FREE 10 ML: 5 INJECTION INTRAVENOUS at 10:20

## 2023-10-05 RX ADMIN — CLOPIDOGREL BISULFATE 75 MG: 75 TABLET ORAL at 10:13

## 2023-10-05 RX ADMIN — METOPROLOL TARTRATE 12.5 MG: 25 TABLET, FILM COATED ORAL at 20:23

## 2023-10-05 RX ADMIN — MORPHINE SULFATE 2 MG: 2 INJECTION, SOLUTION INTRAMUSCULAR; INTRAVENOUS at 16:54

## 2023-10-05 RX ADMIN — OXYBUTYNIN CHLORIDE 5 MG: 5 TABLET ORAL at 20:23

## 2023-10-05 RX ADMIN — WARFARIN SODIUM 4 MG: 2 TABLET ORAL at 17:47

## 2023-10-05 RX ADMIN — ISOSORBIDE MONONITRATE 60 MG: 60 TABLET, EXTENDED RELEASE ORAL at 10:13

## 2023-10-05 RX ADMIN — HEPARIN SODIUM 5000 UNITS: 5000 INJECTION INTRAVENOUS; SUBCUTANEOUS at 17:48

## 2023-10-05 RX ADMIN — HEPARIN SODIUM AND DEXTROSE 12 UNITS/KG/HR: 10000; 5 INJECTION INTRAVENOUS at 09:59

## 2023-10-05 RX ADMIN — PANTOPRAZOLE SODIUM 40 MG: 40 TABLET, DELAYED RELEASE ORAL at 16:43

## 2023-10-05 RX ADMIN — HEPARIN SODIUM 4000 UNITS: 1000 INJECTION INTRAVENOUS; SUBCUTANEOUS at 10:21

## 2023-10-05 RX ADMIN — SODIUM CHLORIDE, PRESERVATIVE FREE 10 ML: 5 INJECTION INTRAVENOUS at 20:25

## 2023-10-05 RX ADMIN — BUMETANIDE 1 MG: 0.25 INJECTION INTRAMUSCULAR; INTRAVENOUS at 10:14

## 2023-10-05 ASSESSMENT — PAIN SCALES - GENERAL
PAINLEVEL_OUTOF10: 0
PAINLEVEL_OUTOF10: 7
PAINLEVEL_OUTOF10: 0
PAINLEVEL_OUTOF10: 7
PAINLEVEL_OUTOF10: 0
PAINLEVEL_OUTOF10: 0

## 2023-10-05 ASSESSMENT — PAIN DESCRIPTION - DESCRIPTORS
DESCRIPTORS: ACHING
DESCRIPTORS: ACHING

## 2023-10-05 ASSESSMENT — PAIN DESCRIPTION - LOCATION
LOCATION: BACK
LOCATION: GENERALIZED
LOCATION: GENERALIZED
LOCATION: BACK

## 2023-10-05 ASSESSMENT — PAIN DESCRIPTION - ORIENTATION
ORIENTATION: MID
ORIENTATION: MID

## 2023-10-05 NOTE — H&P
[address] [place] [address]  [phone]        Hospitalist Admission History and Physical      NAME:  Chris Mark   :   1935   MRN:  [id number]     PCP:  Rosalinda Vergara     Date/Time of service:  10/4/2023  9:19 PM        Subjective:     CHIEF COMPLAINT: chest pain     HISTORY OF PRESENT ILLNESS:     The patient is a 80 yo hx of CAD, chronic afib, PUD, presented w/ angina at rest.  The patient c/o midsternal chest pain this afternoon. He described the pain as \"dull\", 6/10, non-radiating, and improved with nitro. Denied cough, SOB, fevers, chills, diaphoresis. Of note, the patient was admitted last month for a NSTEMI. Cath showed a proximal LAD lesion but patient elected to medical therapy only. In the ED, Trop was neg. Nitro gtt started     No Known Allergies    Prior to Admission medications    Medication Sig Start Date End Date Taking? Authorizing Provider   isosorbide mononitrate (IMDUR) 30 MG extended release tablet Take 1 tablet by mouth daily 23   Megan Griffiths DO   nitroGLYCERIN (NITROSTAT) 0.4 MG SL tablet Place 1 tablet under the tongue every 5 minutes as needed for Chest pain up to max of 3 total doses.  If no relief after 1 dose, call 911. 23   Megan Griffiths DO   rosuvastatin (CRESTOR) 20 MG tablet Take 1 tablet by mouth daily 9/15/23 10/15/23  Venus Mccallum MD   clopidogrel (PLAVIX) 75 MG tablet Take 1 tablet by mouth daily 9/15/23 10/15/23  Silverio Villarreal MD   metoprolol tartrate (LOPRESSOR) 25 MG tablet Take 0.5 tablets by mouth 2 times daily 9/14/23 10/14/23  Silverio Villarreal MD   omeprazole (PRILOSEC) 40 MG delayed release capsule Take 1 capsule by mouth in the morning and at bedtime    Ashok Montoya MD   sucralfate (CARAFATE) 1 GM tablet Take 1 tablet by mouth 2 times daily 23   Ashok Montoya MD   oxybutynin (DITROPAN) 5 MG tablet Take 1 tablet by mouth 2 times daily 23 K 4.2      CO2 21   BUN 16   MG 2.0   ALT 20     No results found for: \"GLUCPOC\"  No results for input(s): \"PH\", \"PCO2\", \"PO2\", \"HCO3\", \"FIO2\" in the last 72 hours. Recent Labs     10/04/23  1954   INR 1.7*       Radiology and EKG reviewed:   CXR neg    **Prior records, notes, labs, radiology, and medications reviewed in [facility]**       Assessment/Plan:       Principal Problem:    82 yo hx of CAD, chronic afib, PUD, presented w/ angina at rest    1) Angina at rest/CAD: likely due to known LAD lesion on heart cath last month. Last echo with EF 50%. Will monitor on Tele. Check serial enzymes. Wean nitro gtt. Cont BB, plavix, statin. Hold coumadin for possible repeat cath. Start heparin gtt if Trop going up. Consult Cards    2) Chronic afib: rate controlled. Cont metoprolol.   Hold coumadin in case of cath    3) PUD: cont PPI    4) Weakness: consult PT/OT    Risk of deterioration: high      Total time spent with patient care: 70 Minutes (35 min on critical care due to nitro gtt)  **I personally saw and examined the patient during this time period**                 Care Plan discussed with: Patient, nursing     Discussed:  Care Plan    Prophylaxis:  Coumadin    Probable Disposition:  Home w/Family           ___________________________________________________    Attending Physician: Jana Adames MD

## 2023-10-05 NOTE — CARE COORDINATION
10/05/23 1321   Readmission Assessment   Number of Days since last admission? 8-30 days   Previous Disposition Home with Home Health   Who is being Interviewed Patient;Caregiver   What was the patient's/caregiver's perception as to why they think they needed to return back to the hospital? Other (Comment)  (had chest pain)   Did you visit your Primary Care Physician after you left the hospital, before you returned this time? No   Why weren't you able to visit your PCP? Other (Comment)   Did you see a specialist, such as Cardiac, Pulmonary, Orthopedic Physician, etc. after you left the hospital? No   Who advised the patient to return to the hospital? Self-referral   Does the patient report anything that got in the way of taking their medications? No   In our efforts to provide the best possible care to you and others like you, can you think of anything that we could have done to help you after you left the hospital the first time, so that you might not have needed to return so soon?  Other (Comment)  (home with [facility])     Blanquita Perez

## 2023-10-05 NOTE — PROGRESS NOTES
Physical Therapy Note:    PT evaluation deferred. Pt off the unit and unavailable to participate.     Essence Zuniga, PT, DPT, [facility] Montero

## 2023-10-05 NOTE — PROGRESS NOTES
injection 5-40 mL  5-40 mL IntraVENous 2 times per day    sodium chloride flush 0.9 % injection 5-40 mL  5-40 mL IntraVENous PRN    0.9 % sodium chloride infusion   IntraVENous PRN    ondansetron (ZOFRAN) injection 4 mg  4 mg IntraVENous Q6H PRN    sennosides-docusate sodium (SENOKOT-S) 8.6-50 MG tablet 1 tablet  1 tablet Oral BID    polyethylene glycol (GLYCOLAX) packet 17 g  17 g Oral Daily PRN    acetaminophen (TYLENOL) tablet 650 mg  650 mg Oral Q6H PRN    Or    acetaminophen (TYLENOL) suppository 650 mg  650 mg Rectal Q6H PRN     Current Outpatient Medications   Medication Sig    isosorbide mononitrate (IMDUR) 30 MG extended release tablet Take 1 tablet by mouth daily    nitroGLYCERIN (NITROSTAT) 0.4 MG SL tablet Place 1 tablet under the tongue every 5 minutes as needed for Chest pain up to max of 3 total doses. If no relief after 1 dose, call 911.     rosuvastatin (CRESTOR) 20 MG tablet Take 1 tablet by mouth daily    clopidogrel (PLAVIX) 75 MG tablet Take 1 tablet by mouth daily    metoprolol tartrate (LOPRESSOR) 25 MG tablet Take 0.5 tablets by mouth 2 times daily    omeprazole (PRILOSEC) 40 MG delayed release capsule Take 1 capsule by mouth in the morning and at bedtime    sucralfate (CARAFATE) 1 GM tablet Take 1 tablet by mouth 2 times daily    oxybutynin (DITROPAN) 5 MG tablet Take 1 tablet by mouth 2 times daily    warfarin (JANTOVEN) 3 MG tablet Take 1 tablet by mouth daily        Lab Data Reviewed: (see below)  Lab Review:     Recent Labs     10/04/23  1954 10/05/23  0445   WBC 8.2 8.0   HGB 12.2 11.8*   HCT 37.7 37.6    257     Recent Labs     10/04/23  1954 10/05/23  0445    139   K 4.2 4.9    111*   CO2 21 23   GLUCOSE 90 74   BUN 16 15   CREATININE 1.48* 1.34*   CALCIUM 8.6 8.5   MG 2.0 2.2   PHOS  --  4.4   LABALBU 3.4* 3.0*   AST 27 66*   ALT 20 21     Lab Results   Component Value Date/Time    GLUCOSE 74 10/05/2023 04:45 AM    GLUCOSE 90 10/04/2023 07:54 PM    GLUCOSE 133 09/21/2023 03:21 AM    GLUCOSE 92 09/20/2023 01:48 AM    GLUCOSE 121 09/19/2023 09:36 AM     No results for input(s): \"PH\", \"PCO2\", \"PO2\", \"HCO3\", \"FIO2\" in the last 72 hours. Recent Labs     10/04/23  1954 10/05/23  0445   INR 1.7* 1.8*     Results       ** No results found for the last 336 hours. **            Other pertinent lab: none    Total time spent with patient: 45 Minutes Critical care. I personally reviewed chart, notes, data and current medications in the medical record. I have personally examined and treated the patient at bedside during this period.                   Care Plan discussed with: Patient, Care Manager, Nursing Staff, Consultant/Specialist, and >50% of time spent in counseling and coordination of care    Discussed:  Care Plan and D/C Planning    Prophylaxis:  Lovenox and H2B/PPI    Disposition:   PT, OT, RN           ___________________________________________________    Attending Physician: Kwan Moreno MD

## 2023-10-05 NOTE — PROGRESS NOTES
Hospice Liaison Note:     Spoke with dtr, plans for pt to discharge tomorrow with [facility]. Pt lives alone but has caregiver support through [facility]. VM left with their coordinator Jake Amador [phone] to discuss discharge and equipment needs. Did not hear back by end of day. Will f/u tomorrow. Per daughter, no hospital bed needed at this time. So no need to hold up discharge tomorrow for DME. Hospice admission RN will order equipment and supplies during admission. Daughter to transport pt in private vehicle at [facility] Friday for [address] admission at home. Hospice pre-admission note to follow.

## 2023-10-05 NOTE — PROGRESS NOTES
[facility] Pharmacy Dosing Services: 10/5/2023    Consult for Warfarin Dosing by Pharmacy by Dr. Alfred Roper provided for this 81 y.o. Male for indication of A Fib    Day of Therapy - Continuation from home  Home dose - 3 mg daily     Dose to achieve an INR goal of 2-3    Order entered for Warfarin  4 mg ordered to be given today at 18:00  (higher than home dose as INR  1.7 on admission and 1.8 today)     Significant drug interactions: Heparin SC  PT/INR Lab Results   Component Value Date/Time    INR 1.8 10/05/2023 09:51 AM      Platelets Lab Results   Component Value Date/Time     10/05/2023 04:45 AM      H/H Lab Results   Component Value Date/Time    HGB 11.8 10/05/2023 04:45 AM        Pharmacy to follow daily and will provide subsequent Warfarin dosing based on clinical status.   [address], [facility]) Contact information [phone]

## 2023-10-05 NOTE — ACP (ADVANCE CARE PLANNING)
Code Status: DNR     Advance Care Planning:    Primary Decision MakeEla Morrison - Child - [phone]     Pt reportedly has AMD and DDNR in place; copies are not on file. Dtr stated copy of pt's DDNR  was given to EMS when pt was transported but copy has not been scanned, was not found on hard chart. In absence of verified Medical POA, pt's two dtrs Shamar Morrison, Jaxon Saba) are legal NOK and would be surrogate decision makers if pt is unable to speak for himself, though pt indicated Hillary Bautista is the primary contact and helps manage his affairs.

## 2023-10-05 NOTE — ED NOTES
Per Dr Wellington Alex titrate nitro drip down and off     Endersijadon Hernandez, GUILLE  10/04/23 9830

## 2023-10-05 NOTE — ACP (ADVANCE CARE PLANNING)
Advance Care Planning     Advance Care Planning (ACP) Physician/NP/PA Conversation    Date of Conversation: 10/05/23    Conducted with: Patient with Decision Making Capacity    Healthcare Decision Maker:    Primary Decision Maker: Pedro Osullivan - [phone]  Click here to complete Healthcare Decision Makers including selection of the Healthcare Decision Maker Relationship (ie \"Primary\"). Diagnoses    CAD, Angina  Shortness of Breath  Physical Debility  Hypoalbuminemia  NSTEMI  Palliative Care Encounter  Advanced Care Planning    Care Preferences:    Met with patient at bedside along with Palliative LCSW Penny. Introduced role of Palliative service. Daughter Diego Kocher and PAUL Zavala Single present. Discussed patient's recent health journey. Recalls this is third admission in past few weeks for CAD/NSTEMIs. Does not wish for further interventions. Wants to be home, would have stayed at home if there was a way to manage his chest pain without coming to hospital.  Reports he is at peace in his spirit and ready to die and be with wife in [place] when the time comes, \"if Aleksandra Aleman will have me\" he jokes. Discuss ways to support him once he leaves hospital and made recommendation for Hospice due to his advanced CAD in which he declines further interventions and has had burdensome symptoms even at rest.  He is agreeable to this. Consult order placed. At present we will continue disease-directed interventions in hospital to optimize patient for discharge with Hospice. Should he have acute change in condition, can readdress comfort only measures. Conversation Outcomes / Follow-Up Plan:  ACP complete - no further action today  Reviewed DNR/DNI and patient confirms DNR status, will provide DDNR.     Length of Voluntary ACP Conversation in minutes:  20 minutes    Deedee Santa MD

## 2023-10-05 NOTE — PROGRESS NOTES
OT order received however since initial order submitted, patient declines further interventions. Hospice has been consulted.    OT services no longer indicated  Loren Joseph, OTR/L

## 2023-10-05 NOTE — CARE COORDINATION
10/05/23 1305   Service Assessment   Patient Orientation Alert and Oriented   Cognition Alert   History Provided By Patient; Child/Family   Primary Caregiver Self   Support Systems Children   Patient's Healthcare Decision Maker is: Legal Next of Kin   PCP Verified by CM Yes  Selma Stevenson @ [phone])   Last Visit to PCP Within last 3 months   Prior Functional Level Assistance with the following:;Shopping;Housework   Current Functional Level Assistance with the following:;Bathing;Dressing; Toileting;Housework; Shopping;Mobility   Can patient return to prior living arrangement Yes   Ability to make needs known: Good   Family able to assist with home care needs: Yes  (caretakers [facility])   Would you like for me to discuss the discharge plan with any other family members/significant others, and if so, who? Yes  (my daughters)   [address] [employer]   CM/SW Referral Other (see comment)  (home hospice referral)   Social/Functional History   Lives With Alone   Type of [facility] One level   Home Access Level entry   Bathroom Shower/Tub Tub/Shower unit   [address] Help From Family;Personal care attendant  [facility] @ Home)   [facility] Needs assistance   Meal Prep Minimal   Laundry Minimal   Vacuuming Maximal   Cleaning Moderate   Driving Total   Shopping Moderate   Ambulation Assistance Needs assistance   Transfer Assistance Independent   Active  No   Occupation Retired   Discharge Planning   Type of [address] [facility] Prior To Admission Durable Medical Equipment   Current DME [facility] to [facility] Other (Comment)  (DME needs will be discussed with hospice,Pt states he will need home oxygen for comfort)   DME Ordered?  Other (comment)  (to be ordered by hospice)   Potential Assistance Purchasing Medications No   Type of Home Care Services Hospice   Patient expects to be discharged to: Apartment     I met with pt ,his daughter and granddaughter to discuss discharge needs. I discussed pt with Palliative Medicine SW who informed me pt has caretakers through [facility] who coordinates all the caretakers for pt and handles call-outs to insure pt is never without help @ home. Daughter contacted [facility] who prefers [facility]. [facility] with a direct message also sent to the hospice liason nurse. Pt states he will need oxygen for comfort @ home and may need other DME. Pt has a rolling walker from his most recent admission. Once DME has been arranged by [facility] management will arrange for pt.'s transport home by hospitals . Likely discharge tomorrow ?     Makayla Mcintosh

## 2023-10-05 NOTE — CARE COORDINATION
I met with hospice liason nurse with [facility] who is contacting pt.'s daughter . DME will be ordered with likely discharge home tomorrow. See my previous note for other details.     Meenakshi Ryan

## 2023-10-05 NOTE — CONSULTS
Palliative Medicine Consult  [place]: [phone] ([id number])    Patient Name: Hollie Alvarez  YOB: 1935    Date of Initial Consult: 10/5/2023  Date of Today's Visit: 10/5/2023  Reason for Consult: Care Decisions  Requesting Provider: Ashley Foster MD      SUMMARY:   Hollie Alvarez is a very pleasant 81 y.o. gentleman with a past history of CAD, chronic afib, PUD, HTN, who was admitted on 10/4/2023 from home with a diagnosis of NSTEMI after experiencing angina at rest at home. He has been seen by Cardiology, on heparin drip, medical management being optimized. Briefly on nitro drip but has been discontinued. Receiving bumex IV for pulm edema/volume overload. 2 Previous admissions in September for NSTEMI. Had [facility]  that showed proximal LAD lesion. Patient and family opted for no interventions due to potential risks/complications. Current medical issues leading to Palliative Medicine involvement include: Care decisions in setting of cardiac disease. Social:   .  three times previously with prior spouses all  from cancer. 2 daughters Sonya Marcelino and Juan R Woo. Lives independently in own apartment with support of [address] [place] [address]. Has aide 2x/week for 4 hours at a time. Manages own ADLs. Ambulates with walker. Enjoys his weekly trips to [employer]. [employer]. Various denominations throughout life, most important to him to have \"personal relationship with Rolando\" no matter denomination. PALLIATIVE DIAGNOSES:   CAD, Angina  Shortness of Breath  Physical Debility  Hypoalbuminemia  NSTEMI  Palliative Care Encounter  Advanced Care Planning     PLAN:   Reviewed medical chart including admit H&P and consultant notes as well as recent labs/imaging. Met with patient at bedside along with Palliative LCSW Penny. Introduced role of Palliative service. Daughter Charla Canchola and PAUL Angeles present. Discussed patient's recent health journey.   Recalls this is third admission upcoming milestones or family events: none reported    The patient identifies the following as important for living well: none reported    Advance Care Planning:  [x] [facility] Interdisciplinary Team has updated the ACP Navigator with Nilesh and Patient [address]     The patient has appointed the following active healthcare agents:    Primary Decision MakerMeade Bodily - Child - [phone]    The Patient has the following current code status:    Code Status: DNR         Other:    As far as possible, the palliative care team has discussed with patient / health care proxy about goals of care / treatment preferences for patient. HISTORY:     History obtained from: patient, medical chart    CHIEF COMPLAINT: chest pain, NSTEMI    HPI/SUBJECTIVE:    The patient is:   [x] Verbal and participatory  [] Non-participatory due to:     Mr. Marimar Townsend presented for acute onset chest pain which occurred at rest, 6/10, dull sensation. Called daughter who then called EMS and was brought to hospital.       ROS / FUNCTIONAL ASSESSMENT:     Palliative Performance Scale (PPS)  PPS: 60         [place] Symptom Assessment Scale (ESAS)  Depression Score: Not depressed  Pain Score: No pain  Anxiety Score: Not anxious  Nausea Score: Not nauseated  Dyspnea Score: 3    Clinical Pain Assessment  Severity: 0               PSYCHOSOCIAL/SPIRITUAL SCREENING:     Palliative IDT has assessed this patient for cultural preferences / practices and a referral made as appropriate to needs (Cultural Services, Patient Advocacy, Ethics, etc.)    Spiritual affiliation was reviewed as documented by palliative care .       Any spiritual / Methodist / cultural beliefs and practices that will affect your patient's care?:  [] Yes /  [x] No   If \"Yes\" to discuss with pastoral care during IDT     Does caregiver feel burdened by caring for their loved one:   [] Yes /  [x] No /  [] No Caregiver

## 2023-10-05 NOTE — PROGRESS NOTES
Palliative Medicine      Code Status: DNR    Advance Care Planning:    Primary Decision MakeEdmond Carcamo - Child - [phone]    Pt reportedly has AMD and DDNR in place; copies are not on file. Dtr stated copy of pt's DDNR  was given to EMS when pt was transported but copy has not been scanned, was not found on hard chart. In absence of verified Medical POA, pt's two dtrs Dat Carcamo, Cesar Brumfield) are legal NOK and would be surrogate decision makers if pt is unable to speak for himself, though pt indicated Álvaro Sánchez is the primary contact and helps manage his affairs. Patient / Family Encounter Documentation    Participants (names): Pt, dtr Álvaro Sánchez, son-in-law Leola Schmittkavon, Palliative Medicine (Dr. Michael Geiger, [address])    Narrative:  Pt was awake in bed, alert and talkative, very pleasant. Pt shared that he lost 3 wives to cancer, moved to [place] after the death of his third wife Kimberly in 2017. Pt lives alone, both dtrs live nearby. Dtr Veldon Fly works but Álvaro Sánchez retired several years ago and is more available to assist.  Pt has caregivers for several hrs on Tuesdays and Fridays through [facility]. Pt reports he mostly needs standby assistance with showers, needs minimal help with dressing, can otherwise manage ADLs on his own. Dtr reports plan would be for pt to move to [address] he is no longer able to care for himself, though pt is hopeful to be able to remain in his apartment. Pt verbalized understanding of current medical issues, is clear that he would not want surgery or other invasive interventions at this stage in his life. Pt is at peace with his mortality, is ready to be reunited with his beloved Nadia Ramirez whenever God calls him. Hospice was discussed as an option to support pt/family in the home setting, with a goal of managing symptoms, optimizing quality of life, and avoiding rehospitalization if possible.       Psychosocial Issues Identified/ Resilience Factors:  Pt

## 2023-10-05 NOTE — CONSULTS
[facility]                    Cardiology Care Note     [x]Initial Encounter     []Follow-up    Patient Name: Faviola Ardon - JZW:[birth year] - ECQ:[id number]  Primary Cardiologist[facility]  Consulting Cardiologist: Dory Cabot, MD     Reason for encounter: NSTEMI    HPI:       Faviola Ardon is a 81 y.o. male with PMH significant for CAD recent NSTEMI (medical mgt) chronic afib, PUD, presented w/ angina at rest.  The patient c/o midsternal chest pain yesterday afternoon. He described the pain as \"dull\", 6/10, non-radiating, and improved with nitro and morphine. He has been pain free since 11 pm last night    Denied cough, SOB, fevers, chills, diaphoresis. Cath showed a proximal LAD lesion but patient elected to medical therapy only. In the ED initial troponin was negative however jo ann to 11,111. Ntg drip off, ACS heparin to be initiated. Subjective:      Faviola Ardon reports dyspnea. Assessment and Plan     1 NSTEMI : known obs 1 V CAD. ACS heparin ofr 48 hours. Cont BB, asa, plavix. Increase imdur to 60 mg qd. Can add ranexa if needed. Lengthy discussion with pt and reviewed previous cath report from Dr El Callejas in September. He does not want to pursue cath/pci/cabg. Trend troponins, repeat limited ECHO. 2 chronic a fib : on BB, warfarin OP, rate stable     3 Dyspnea/elevated pBNP: dose bumex 1 mg IV now. DNR   Consider palliative consult for overall goals of care.       ____________________________________________________________    Cardiac testing  09/10/23    ECHO (TTE) COMPLETE (CONTRAST/BUBBLE/3D PRN) 09/12/2023 12:19 PM (Final)    Interpretation Summary    Left Ventricle: Mild left ventricular systolic dysfunction (EF 10%) with mid-distal septal hypokinesis. Left ventricle size is normal. Normal wall thickness. Left Atrium: Left atrium is mildly dilated. Aortic Valve: Valve structure is normal. Moderately thickened cusp. Mildly calcified cusp.  Mild

## 2023-10-06 ENCOUNTER — HOME CARE VISIT (OUTPATIENT)
Facility: HOME HEALTH | Age: 88
End: 2023-10-06
Payer: MEDICARE

## 2023-10-06 VITALS
DIASTOLIC BLOOD PRESSURE: 59 MMHG | OXYGEN SATURATION: 98 % | RESPIRATION RATE: 16 BRPM | SYSTOLIC BLOOD PRESSURE: 104 MMHG | HEART RATE: 84 BPM

## 2023-10-06 VITALS
DIASTOLIC BLOOD PRESSURE: 82 MMHG | OXYGEN SATURATION: 98 % | HEIGHT: 66 IN | BODY MASS INDEX: 27.32 KG/M2 | HEART RATE: 70 BPM | SYSTOLIC BLOOD PRESSURE: 118 MMHG | WEIGHT: 170 LBS | TEMPERATURE: 98.4 F | RESPIRATION RATE: 20 BRPM

## 2023-10-06 LAB
ALBUMIN SERPL-MCNC: 2.8 G/DL (ref 3.5–5)
ALBUMIN/GLOB SERPL: 0.7 (ref 1.1–2.2)
ALP SERPL-CCNC: 56 U/L (ref 45–117)
ALT SERPL-CCNC: 20 U/L (ref 12–78)
ANION GAP SERPL CALC-SCNC: 3 MMOL/L (ref 5–15)
AST SERPL-CCNC: 82 U/L (ref 15–37)
BILIRUB SERPL-MCNC: 1.7 MG/DL (ref 0.2–1)
BUN SERPL-MCNC: 22 MG/DL (ref 6–20)
BUN/CREAT SERPL: 14 (ref 12–20)
CALCIUM SERPL-MCNC: 8.3 MG/DL (ref 8.5–10.1)
CHLORIDE SERPL-SCNC: 104 MMOL/L (ref 97–108)
CO2 SERPL-SCNC: 29 MMOL/L (ref 21–32)
CREAT SERPL-MCNC: 1.56 MG/DL (ref 0.7–1.3)
ERYTHROCYTE [DISTWIDTH] IN BLOOD BY AUTOMATED COUNT: 15.7 % (ref 11.5–14.5)
GLOBULIN SER CALC-MCNC: 4 G/DL (ref 2–4)
GLUCOSE SERPL-MCNC: 86 MG/DL (ref 65–100)
HCT VFR BLD AUTO: 37.5 % (ref 36.6–50.3)
HGB BLD-MCNC: 11.9 G/DL (ref 12.1–17)
INR PPP: 2.3 (ref 0.9–1.1)
MAGNESIUM SERPL-MCNC: 2.1 MG/DL (ref 1.6–2.4)
MCH RBC QN AUTO: 29.4 PG (ref 26–34)
MCHC RBC AUTO-ENTMCNC: 31.7 G/DL (ref 30–36.5)
MCV RBC AUTO: 92.6 FL (ref 80–99)
NRBC # BLD: 0 K/UL (ref 0–0.01)
NRBC BLD-RTO: 0 PER 100 WBC
PHOSPHATE SERPL-MCNC: 3.6 MG/DL (ref 2.6–4.7)
PLATELET # BLD AUTO: 244 K/UL (ref 150–400)
PMV BLD AUTO: 10.3 FL (ref 8.9–12.9)
POTASSIUM SERPL-SCNC: 4.3 MMOL/L (ref 3.5–5.1)
PROT SERPL-MCNC: 6.8 G/DL (ref 6.4–8.2)
PROTHROMBIN TIME: 22.5 SEC (ref 9–11.1)
RBC # BLD AUTO: 4.05 M/UL (ref 4.1–5.7)
SODIUM SERPL-SCNC: 136 MMOL/L (ref 136–145)
TROPONIN I SERPL HS-MCNC: 9345 NG/L (ref 0–76)
WBC # BLD AUTO: 6.8 K/UL (ref 4.1–11.1)

## 2023-10-06 PROCEDURE — APPNB15 APP NON BILLABLE TIME 0-15 MINS: Performed by: NURSE PRACTITIONER

## 2023-10-06 PROCEDURE — 2700000000 HC OXYGEN THERAPY PER DAY

## 2023-10-06 PROCEDURE — 99232 SBSQ HOSP IP/OBS MODERATE 35: CPT | Performed by: STUDENT IN AN ORGANIZED HEALTH CARE EDUCATION/TRAINING PROGRAM

## 2023-10-06 PROCEDURE — 6370000000 HC RX 637 (ALT 250 FOR IP): Performed by: NURSE PRACTITIONER

## 2023-10-06 PROCEDURE — 85027 COMPLETE CBC AUTOMATED: CPT

## 2023-10-06 PROCEDURE — G0299 HHS/HOSPICE OF RN EA 15 MIN: HCPCS

## 2023-10-06 PROCEDURE — 36415 COLL VENOUS BLD VENIPUNCTURE: CPT

## 2023-10-06 PROCEDURE — 6370000000 HC RX 637 (ALT 250 FOR IP): Performed by: INTERNAL MEDICINE

## 2023-10-06 PROCEDURE — 2580000003 HC RX 258: Performed by: INTERNAL MEDICINE

## 2023-10-06 PROCEDURE — 84100 ASSAY OF PHOSPHORUS: CPT

## 2023-10-06 PROCEDURE — 6360000002 HC RX W HCPCS: Performed by: INTERNAL MEDICINE

## 2023-10-06 PROCEDURE — 94761 N-INVAS EAR/PLS OXIMETRY MLT: CPT

## 2023-10-06 PROCEDURE — 80053 COMPREHEN METABOLIC PANEL: CPT

## 2023-10-06 PROCEDURE — 85610 PROTHROMBIN TIME: CPT

## 2023-10-06 PROCEDURE — 84484 ASSAY OF TROPONIN QUANT: CPT

## 2023-10-06 PROCEDURE — 83735 ASSAY OF MAGNESIUM: CPT

## 2023-10-06 RX ADMIN — HEPARIN SODIUM 5000 UNITS: 5000 INJECTION INTRAVENOUS; SUBCUTANEOUS at 01:08

## 2023-10-06 RX ADMIN — ROSUVASTATIN CALCIUM 20 MG: 10 TABLET, COATED ORAL at 08:54

## 2023-10-06 RX ADMIN — SODIUM CHLORIDE, PRESERVATIVE FREE 10 ML: 5 INJECTION INTRAVENOUS at 08:55

## 2023-10-06 RX ADMIN — CLOPIDOGREL BISULFATE 75 MG: 75 TABLET ORAL at 08:54

## 2023-10-06 RX ADMIN — OXYBUTYNIN CHLORIDE 5 MG: 5 TABLET ORAL at 08:54

## 2023-10-06 RX ADMIN — HEPARIN SODIUM 5000 UNITS: 5000 INJECTION INTRAVENOUS; SUBCUTANEOUS at 08:55

## 2023-10-06 RX ADMIN — ACETAMINOPHEN 650 MG: 325 TABLET ORAL at 13:49

## 2023-10-06 RX ADMIN — METOPROLOL TARTRATE 12.5 MG: 25 TABLET, FILM COATED ORAL at 08:54

## 2023-10-06 RX ADMIN — ISOSORBIDE MONONITRATE 60 MG: 60 TABLET, EXTENDED RELEASE ORAL at 08:55

## 2023-10-06 RX ADMIN — PANTOPRAZOLE SODIUM 40 MG: 40 TABLET, DELAYED RELEASE ORAL at 06:08

## 2023-10-06 ASSESSMENT — PAIN SCALES - GENERAL: PAINLEVEL_OUTOF10: 0

## 2023-10-06 NOTE — PLAN OF CARE
Problem: Discharge Planning  Goal: Discharge to home or other facility with appropriate resources  Outcome: Progressing  Flowsheets  Taken 10/5/2023 1600 by Familia Willard RN  Discharge to home or other facility with appropriate resources: Identify barriers to discharge with patient and caregiver  Taken 10/5/2023 1200 by Familia Willard RN  Discharge to home or other facility with appropriate resources:   Identify barriers to discharge with patient and caregiver   Arrange for needed discharge resources and transportation as appropriate     Problem: Safety - Adult  Goal: Free from fall injury  Outcome: Progressing     Problem: Pain  Goal: Verbalizes/displays adequate comfort level or baseline comfort level  Outcome: Progressing  Flowsheets  Taken 10/5/2023 2000 by Eloy Matamoros RN  Verbalizes/displays adequate comfort level or baseline comfort level:   Encourage patient to monitor pain and request assistance   Assess pain using appropriate pain scale   Administer analgesics based on type and severity of pain and evaluate response  Taken 10/5/2023 1600 by Familia Willard RN  Verbalizes/displays adequate comfort level or baseline comfort level:   Encourage patient to monitor pain and request assistance   Assess pain using appropriate pain scale  Taken 10/5/2023 1200 by Familia Willard RN  Verbalizes/displays adequate comfort level or baseline comfort level:   Encourage patient to monitor pain and request assistance   Assess pain using appropriate pain scale   Administer analgesics based on type and severity of pain and evaluate response  Taken 10/5/2023 1014 by Familia Willard RN  Verbalizes/displays adequate comfort level or baseline comfort level:   Encourage patient to monitor pain and request assistance   Assess pain using appropriate pain scale   Administer analgesics based on type and severity of pain and evaluate response     Problem: ABCDS Injury Assessment  Goal: Absence of physical injury  Outcome: Progressing     Problem: Skin/Tissue Integrity  Goal: Absence of new skin breakdown  Description: 1. Monitor for areas of redness and/or skin breakdown  2. Assess vascular access sites hourly  3. Every 4-6 hours minimum:  Change oxygen saturation probe site  4. Every 4-6 hours:  If on nasal continuous positive airway pressure, respiratory therapy assess nares and determine need for appliance change or resting period.   Outcome: Progressing

## 2023-10-06 NOTE — PROGRESS NOTES
PT orders received. Chart reviewed. Plan for d/c with hospice services. Will complete PT orders.     Thank you,  Landon Concepcion, PT, DPT This pt is scheduled for breast MRI s/p neoadjuvant chemotherapy. Timing is essential and she is on the schedule for 3/11/19 to be followed up with Dr Tamy Boykin on 3/14, followed by surgery. Could you please get this pre-auth completed so that our time frame is not affected? Thank you.

## 2023-10-06 NOTE — CONSULTS
Palliative Medicine Consult  [place]: [phone] ([phone])    Patient Name: Koko Jacob  YOB: 1935    Date of Initial Consult: 10/5/2023  Date of Today's Visit: 10/6/2023  Reason for Consult: Care Decisions  Requesting Provider: Angela Mejia MD      SUMMARY:   Koko Jacob is a very pleasant 81 y.o. gentleman with a past history of CAD, chronic afib, PUD, HTN, who was admitted on 10/4/2023 from home with a diagnosis of NSTEMI after experiencing angina at rest at home. He has been seen by Cardiology, on heparin drip, medical management being optimized. Briefly on nitro drip but has been discontinued. Receiving bumex IV for pulm edema/volume overload. 2 Previous admissions in September for NSTEMI. Had [address] [facility]  that showed proximal LAD lesion. Patient and family opted for no interventions due to potential risks/complications. Current medical issues leading to Palliative Medicine involvement include: Care decisions in setting of cardiac disease. Social:   .  three times previously with prior spouses all  from cancer. 2 daughters Irvin Gomez and Derrick Gonzalez. Lives independently in own apartment with support of [facility]. Has aide 2x/week for 4 hours at a time. Manages own ADLs. Ambulates with walker. Enjoys his weekly trips to [employer]. [employer]. Various denominations throughout life, most important to him to have \"personal relationship with Rolando\" no matter denomination. PALLIATIVE DIAGNOSES:   CAD, Angina  Shortness of Breath  Physical Debility  Hypoalbuminemia  NSTEMI  Palliative Care Encounter  Advanced Care Planning     PLAN:   Reviewed medical chart including progress/consultant notes as well as recent labs/imaging. Noted plan for discharge with [facility] hospice today. Reports feeling well this morning, feels \"normal, like nothing is wrong. \"  Shared that this will be the goal with Hospice, to continue feeling as well as he can and completing the provider-level activities documented in the note. This includes time spent prior to the visit and after the visit in direct care of the patient. This time does not include time spent in any separately reportable services.

## 2023-10-06 NOTE — CARE COORDINATION
10/6/23  9:37 AM    PERRY spoke to [address] [place] [address], [place], patient to dc home today with [facility] at 2:00 PM. His daughter will provide transport. PERRY sent message to MD requesting dc order.     [facility]

## 2023-10-06 NOTE — PROGRESS NOTES
Nutrition Note    removed 2gm Na restriction on hospice patient    Electronically signed by Heide Salas MS, RD on 52/6/62 at 10:43 AM EDT    Contact: Ext: [phone], or via [employer]

## 2023-10-06 NOTE — PROGRESS NOTES
[address] [facility]  [phone] PRE-Admission   Discharge Summary  Patient Name: Hollie Alvaerz  YOB: 1935  Age: 81 y.o. Date of PLANNED Hospice Admission: Friday 10/6 at [facility] Attending: Dr. Ani Mir  Primary Care Physician: Kirsten Iqbal Dr Address:  [address] [place] [address],[address]   [facility]    Primary Contact and Phone:  Kianna Zambrano [phone]      ADVANCE CARE PLANNING    Code Status: DNR  Durable DNR: []  Yes  [x]  No      10/5/2023     1:14 PM   Demographics   Marital Status        Rastafarian: Other   Home: not discussed    HOSPICE SUMMARY     Verbal CTI of terminal diagnosis with life expectancy of 6 months or less received from: Dr. Ani Mir    For the Hospice Diagnosis of: End Stage Cardiac Disease    NCD: discuss on admission      CLINICAL INFORMATION   Allergies: No Known Allergies      Currently this patient has:  [x] Supplemental O2     [] PICC    [] PORT   [x] Purewick Catheter [] Ostomy  [] NG Tube  [] PEG Tube    [] Wounds       Does patient have an AICD device:  [] Yes     [x] No    Has ICD been deactivated? [] Yes     [] No         COVID Screening: not tested on admission      ASSESSMENT & PLAN     1. Symptom Issues Identified: dyspnea, weakness but ambulates w/rollator walker    2. Spiritual Issues  Identified: none identified at this time    3. Psych/ Social/ Emotional Issues Identified: pt is very stubborn, lives alone in his apartment but has caregiver support through [facility]. Coordinator is Kenya Dixon [phone]            CARE COORDINATION           Hospice Consents: to be signed on admission w/ dtr Charla Canchola present    2. DME Ordered/Company/Delivery Plan: to be ordered on admission - no bed requested at this time as pt ambulates w/rollator walker and sleeps in recliner    3.    Unique home needs for safety: Bariatric patient:

## 2023-10-06 NOTE — PROGRESS NOTES
1251 Patient getting discharged home , daughter to transport home. IV removed and educated the discharge instructions and given a chance to ask questions. Daughter to arrive at 1330 to pick him up , awaiting transport .   Patient discharged at 1500

## 2023-10-06 NOTE — PROGRESS NOTES
5-40 mL IntraVENous PRN    0.9 % sodium chloride infusion   IntraVENous PRN    ondansetron (ZOFRAN) injection 4 mg  4 mg IntraVENous Q6H PRN    sennosides-docusate sodium (SENOKOT-S) 8.6-50 MG tablet 1 tablet  1 tablet Oral BID    polyethylene glycol (GLYCOLAX) packet 17 g  17 g Oral Daily PRN    acetaminophen (TYLENOL) tablet 650 mg  650 mg Oral Q6H PRN    Or    acetaminophen (TYLENOL) suppository 650 mg  650 mg Rectal Q6H PRN        Lab Data Reviewed: (see below)  Lab Review:     Recent Labs     10/04/23  1954 10/05/23  0445 10/06/23  0632   WBC 8.2 8.0 6.8   HGB 12.2 11.8* 11.9*   HCT 37.7 37.6 37.5    257 244       Recent Labs     10/04/23  1954 10/05/23  0445 10/06/23  0632    139 136   K 4.2 4.9 4.3    111* 104   CO2 21 23 29   GLUCOSE 90 74 86   BUN 16 15 22*   CREATININE 1.48* 1.34* 1.56*   CALCIUM 8.6 8.5 8.3*   MG 2.0 2.2 2.1   PHOS  --  4.4 3.6   LABALBU 3.4* 3.0* 2.8*   AST 27 66* 82*   ALT 20 21 20       Lab Results   Component Value Date/Time    GLUCOSE 86 10/06/2023 06:32 AM    GLUCOSE 74 10/05/2023 04:45 AM    GLUCOSE 90 10/04/2023 07:54 PM    GLUCOSE 133 09/21/2023 03:21 AM    GLUCOSE 92 09/20/2023 01:48 AM     No results for input(s): \"PH\", \"PCO2\", \"PO2\", \"HCO3\", \"FIO2\" in the last 72 hours. Recent Labs     10/05/23  0445 10/05/23  0951 10/06/23  0632   INR 1.8* 1.8* 2.3*       Results       ** No results found for the last 336 hours. **            Other pertinent lab: none    Total time spent with patient: 28 Minutes I personally reviewed chart, notes, data and current medications in the medical record. I have personally examined and treated the patient at bedside during this period.                   Care Plan discussed with: Patient, Care Manager, Nursing Staff, Consultant/Specialist, and >50% of time spent in counseling and coordination of care    Discussed:  Care Plan and D/C Planning    Prophylaxis:  Lovenox and H2B/PPI    Disposition:   PT, OT, RN           ___________________________________________________    Attending Physician: Maribel Arriola MD

## 2023-10-07 ENCOUNTER — HOME CARE VISIT (OUTPATIENT)
Age: 88
End: 2023-10-07
Payer: MEDICARE

## 2023-10-07 VITALS
SYSTOLIC BLOOD PRESSURE: 120 MMHG | HEART RATE: 65 BPM | DIASTOLIC BLOOD PRESSURE: 57 MMHG | OXYGEN SATURATION: 97 % | RESPIRATION RATE: 20 BRPM

## 2023-10-07 PROCEDURE — G0299 HHS/HOSPICE OF RN EA 15 MIN: HCPCS

## 2023-10-07 NOTE — HOSPICE
Bev Pierceky  1935  88                                                         Principle Hospice Diagnosis: end stage cardiac disease  Diagnoses RELATED to the terminal prognosis:   NSTEMI (non-ST elevated myocardial infarction)   Acute electrocardiogram changes   Chest pain, unspecified type   Coronary artery disease of native heart with stable angina pectoris, unspecified vessel or lesion type  A Fib      Other Diagnoses:   Anemia - POA, mild and stable. Normal serologies. CKD (chronic kidney disease) stage 3 - Stable, stop checking on hospice   Hypoalbuminemia - Likely due to age and chronic issues   Peptid ulcer disease / GERD (gastroesophageal reflux disease) - PPI   Bladder spasms - Continue oxybutynin   Dementia       Date of Hospice Admission: 10/6/2023  Hospice Attending Elected by Patient: Renay Combs  Primary Care Physician: Jarvis Rivera    Admitting RN: Narendra Wiggins  Nurse CM: Monet Urrutia  : Selena Robert:    Isabel Liang DNR:  Yes       Service:           No             Home: did not discuss    Direct Observation: a&o x 4. ambulates using rollator. lives alone. resp even and unlabored. o2 at 4 L via NC. breath sounds clear and diminished. bowel sounds active. last BM 10/4. denies incontinence. denies pain, dyspnea. denies falls. ER Visits/ Hospitalizations in past year: 3  Onset Date of Hospice Diagnosis: 2023  Summary of Disease Progression Leading to Hospice Diagnosis:     64-year-old male presents from home via EMS with a complaint of chest pain. Symptoms started this evening around 6:00. States he took 3 sublingual nitroglycerin with no relief of his symptoms. He denies any cough, fever, vomiting, headaches. Patient was discharged from the hospital a couple weeks ago after being treated for an NSTEMI. He had a cath 3 weeks ago that showed severe proximal LAD stenosis. Decision was made to pursue medical management with Coumadin and Plavix.  He was discharged

## 2023-10-07 NOTE — HOSPICE
Melquiades Chester (1935)   Patient had called triage this morning stating that his oxygen concentrator was not working. He did not feel comfortable turning on his oxygen cylinder tank. Patient received sitting in his recliner wearing his nasal cannula. After introductions, patient stated his son-in-law came over to the house and fixed the oxygen. Concerned the concentrator may be faulty, inquired about what was broken that needed to be fixed. He then told me that the electrical receptacle the concentrator was plugged into was connected to the wall switch, and the switch was off. He then stated he called [employer] and cancelled the service request. Patient is alert and oriented. We discussed the patients care team.  He will want a HHA twice a week as a standby and sometimes assist when he showers and gets dressed. He is trying to do as much as possible but sometimes needs the help. Informed him when his nurse visits for the first time she will work with him on a schedule that works for everyone. Patient is on warfarin. He will need PT/INR drawn. In the past he goes once a month. His last one on file was 10/6/20 and his IRN was 2.3. His range is 2-3. He also mentioned he uses toilet paper to help with incontinence issues. Educated patient on how to maintain skin integrity and prevent infections. I told him I would order him some pads for him to insert into his briefs because he did not want the pull ups. Medline conf # [id number]. BP right arm 105/55, left arm 120/57, HR 60-70 controlled Afib, RR 20, SpO2 97% on 4L oxygen via nasal cannula, afebrile. Lungs LLL crackles, RLL diminished, shallow breaths, RR increases when patient is talking. Pulses palpable x 4. No JVD, Bruit, or murmur. BLE scott with trace edema, sensation is present. Abdomen is distended but soft, BS x 4, nontender. Patient may get edematous in the abdomen. Stated sometimes in the evening his pants feel a little tighter.  Patient has a list

## 2023-10-10 ENCOUNTER — HOME CARE VISIT (OUTPATIENT)
Age: 88
End: 2023-10-10
Payer: MEDICARE

## 2023-10-10 ENCOUNTER — HOME CARE VISIT (OUTPATIENT)
Facility: HOME HEALTH | Age: 88
End: 2023-10-10
Payer: MEDICARE

## 2023-10-10 VITALS
DIASTOLIC BLOOD PRESSURE: 83 MMHG | HEART RATE: 74 BPM | RESPIRATION RATE: 20 BRPM | OXYGEN SATURATION: 100 % | SYSTOLIC BLOOD PRESSURE: 143 MMHG

## 2023-10-10 PROCEDURE — G0155 HHCP-SVS OF CSW,EA 15 MIN: HCPCS

## 2023-10-10 PROCEDURE — G0299 HHS/HOSPICE OF RN EA 15 MIN: HCPCS

## 2023-10-10 ASSESSMENT — ENCOUNTER SYMPTOMS: PAIN LOCATION - PAIN QUALITY: PRESSURE

## 2023-10-11 RX ORDER — NITROGLYCERIN 0.4 MG/1
0.4 TABLET SUBLINGUAL EVERY 5 MIN PRN
Qty: 25 TABLET | Refills: 3 | Status: CANCELLED | OUTPATIENT
Start: 2023-10-11

## 2023-10-11 RX ORDER — NITROGLYCERIN 0.4 MG/1
0.4 TABLET SUBLINGUAL EVERY 5 MIN PRN
Qty: 25 TABLET | Refills: 3 | Status: SHIPPED | OUTPATIENT
Start: 2023-10-11

## 2023-10-11 NOTE — HOSPICE
Initial RNCM visit. Joint visit with Shelia Dinh. At visit is pts dtr Malinda Briggs, and care coordinator Chelsea. Pts dtr Pia galarza present on phone. Pt has hired caregiver through [facility] [employer] with actual caregiver provided by [employer]. This is a program he joined years ago when he was very well and not he is using the service paying a monthly membership fee. They will provide minimum of 3 hours a day and up to 24 hours a day, or pt has an option for placement at [facility]. Currently CG Jessi is with pt from 11am to 2pm.  It is discussed and she will be present for pts showers and HHA from [address] [place] [address] will not be needed at this time. Pt is on  4L of oxygen because that is what he was on at the hospital.  Formerly he was on 2 L and it was sufficient support. Discussion occured regarding need for oxygen and use of the oxygen. Understanding if pt is on 4L he will get used to this liter flow and pt is agreeable to reducing since his saturation is 100%. Breath sounds are diminished and he may be a CO2 retainer. Decreased to 3.5L and will reassess at next visit. Pt reporting feeling fine and understanding he can remove oxygen and test his ability to manage without it in a safe way. General edema abdomen and legs. Pt had an episode of chest pain sunday and took a NTG x 1 with relief. Discussion occured regarding pt report of chest pain that is relieved by [facility] which often works. Infrequent use of NTG but it is added to the [facility]. Pt is stating he read the literature that accompanied the [facility] meds and does not want to take any of them. Discussion occured. Also pt did not receive the hydromorphone that was ordered (?) and this will require follow up. MAR is updated to include medications pt is currently taking that have dropped off the list.  Pt will need a further med rec with NP visit. Will plan NP visit next week. Pt and dtr understand to call with any concerns.

## 2023-10-16 ENCOUNTER — HOME CARE VISIT (OUTPATIENT)
Facility: HOME HEALTH | Age: 88
End: 2023-10-16
Payer: MEDICARE

## 2023-10-16 ENCOUNTER — HOME HEALTH/ HOSPICE FACE TO FACE (OUTPATIENT)
Age: 88
End: 2023-10-16

## 2023-10-16 PROCEDURE — G0299 HHS/HOSPICE OF RN EA 15 MIN: HCPCS

## 2023-10-17 VITALS
HEART RATE: 56 BPM | OXYGEN SATURATION: 99 % | DIASTOLIC BLOOD PRESSURE: 63 MMHG | RESPIRATION RATE: 20 BRPM | SYSTOLIC BLOOD PRESSURE: 144 MMHG

## 2023-10-17 ASSESSMENT — ENCOUNTER SYMPTOMS
DYSPNEA ACTIVITY LEVEL: AFTER AMBULATING LESS THAN 10 FT
STOOL DESCRIPTION: FORMED

## 2023-10-17 NOTE — HOSPICE
Joint visit with Linda Garrett NP  Hired caregiver Bert Chase present briefly for introductions then she departed. Pts feet are purple with very dry and peeling skin, lower legs are discolored from just below the knee to foot as if he is wearing socks. He states they have been this way for yesrs. Bruises easily due to coumadin. Discussion occured about use of compression socks and pt has some but can not get them on. Asked if he would have hired CG help and he was not certain. Will look for style with a zipper, pt not overly concerned about his feet. He reports neuropathy but also not concerned and does not feel it warrants medication. He says it feels \"like paint drying on the bottom of my feet\". Pt takes NTG tablets for chest pain /pressure and states he does not need more than 1 tablet. Hydromorphone never received and will be sent. Pt instructed to put in symptom kit in refrigerator when it arrives. PRN acetaminophen. Pt understands he will receive some additional oxygen supplies as he has requested. He feels the oxygen in the tanks is sufficient for now and not in need of replacing. He will also receive hydromorphone and NTG tablets. Pt agrees to call with any concerns. Provided copies of POA and Advanced directives for our record.

## 2023-10-17 NOTE — HOSPICE
[facility]   Good Help to Those in Need  [phone]    Patient Name: Tasha Talley  YOB: 1935    Date of Provider Hospice Visit: 10/16/23    Level of Care:   [] General Inpatient (GIP)    [x] Routine   [] Respite    Current Location of Care: Home     Date of Original Hospice Admission: 10/6/2023  Hospice Medical Director at time of admission: Dr. Rudy Bennett Diagnosis: End stage heart disease  Diagnoses RELATED to the terminal prognosis: NSTEMI, chest pain, CAD with angina, atrial fibrillation  Other Diagnoses: Anemia, CKD, PUD/GERD, bladder spasms, dementia     HOSPICE SUMMARY     Tasha Talley is a 81y.o. year old  male who was admitted to [facility] on routine level of care at home on 10/6/2023. Resides alone in ground level apartment. Has private caregivers for several hours daily with ability to increase hours; notes further suggest that going to [address] [place] [address] is option if needed. Adult children live locally and are very involved in pt's care. The patient's principle diagnosis has resulted in increased weakness, angina, CAMPBELL, peripheral edema, and atrial fibrillation requiring long-term anticoagulation therapy. Functionally, the patient's Karnofsky and/or Palliative Performance Scale is estimated at 50. The patient requires assistance with all ADL care due to CAMPBELL and poor activity tolerance. The patient/family chose comfort measures with the support of Hospice. HOSPICE DIAGNOSES   Active Symptoms:  1. Generalized weakness  2, Angina  3. CAMPBELL  4. Peripheral edema  5. Atrial fibrillation with long-term use of [address]  6. HTN/atrial fibrillation  7. Bladder spasms  8.  Hospice care     PLAN   Continue routine level of care at home  Reviewed hospice philosophy and goals of care with emphasis on comfort and safety; reviewed symptom management medications with indications and rationale for use; reviewed and reconciled routine maintenance

## 2023-10-23 ENCOUNTER — HOME CARE VISIT (OUTPATIENT)
Age: 88
End: 2023-10-23
Payer: MEDICARE

## 2023-10-24 ENCOUNTER — HOME CARE VISIT (OUTPATIENT)
Facility: HOME HEALTH | Age: 88
End: 2023-10-24
Payer: MEDICARE

## 2023-10-24 VITALS
SYSTOLIC BLOOD PRESSURE: 126 MMHG | OXYGEN SATURATION: 97 % | HEART RATE: 61 BPM | RESPIRATION RATE: 20 BRPM | DIASTOLIC BLOOD PRESSURE: 79 MMHG

## 2023-10-24 PROCEDURE — G0299 HHS/HOSPICE OF RN EA 15 MIN: HCPCS

## 2023-10-24 ASSESSMENT — ENCOUNTER SYMPTOMS: DYSPNEA ACTIVITY LEVEL: WHILE SPEAKING

## 2023-10-25 NOTE — HOSPICE
Routine weekly visit. Hired cg present initially and then was excused by pt. His daughter Bucky Shields arrived shortly after this writer did. Bucky Shields has some anxiety about pt condition and states she is noticing some forgetfulness and light confusion. Last evening pt fell asleep and he then woke up at twilight and thought he had slept thru the night and took his Isosorbide and got very nervous when he realized it was evening instead of morning. He reports calling triage was very helpful and the nurse he spoke with was outstanding and he was so reassured to be able to call in for advice. Pt had his diabetic socks on and reports them to be the most comfortable. He has compression socks, several pairs but prefers the soft socks. He is having CG help put lotion on his feet. Concentrator is set at 3 L today. Pt reports he does not adjust the settings. Pt was able to get to [employer] and visit his friends that work at the store. He was assisted by CG and used an O2 tank. Will request refill on 2 tanks. He reports going for short intervals off the O2 such as going to kitchen or bathroom. This am he was more short of breath following his shower and it did not resolve with use of O2 and rest and he needed to take a NTG tablet to relieve the pressure in his chest.  Medications will be refilled. Will request refill of 2 E tanks O2. Next visit pt will have PT/INR by fingerstick.

## 2023-10-28 ENCOUNTER — HOME CARE VISIT (OUTPATIENT)
Age: 88
End: 2023-10-28
Payer: MEDICARE

## 2023-10-28 VITALS
DIASTOLIC BLOOD PRESSURE: 61 MMHG | TEMPERATURE: 98.8 F | OXYGEN SATURATION: 97 % | RESPIRATION RATE: 20 BRPM | SYSTOLIC BLOOD PRESSURE: 131 MMHG | HEART RATE: 72 BPM

## 2023-10-28 PROCEDURE — G0299 HHS/HOSPICE OF RN EA 15 MIN: HCPCS

## 2023-10-30 PROBLEM — I50.84 END STAGE HEART FAILURE (HCC): Status: ACTIVE | Noted: 2023-01-01

## 2023-10-30 PROBLEM — R06.02 SHORTNESS OF BREATH: Status: ACTIVE | Noted: 2023-01-01

## 2023-10-30 PROBLEM — R45.1 RESTLESSNESS: Status: ACTIVE | Noted: 2023-01-01

## 2023-10-30 PROBLEM — Z51.5 HOSPICE CARE: Status: ACTIVE | Noted: 2023-01-01

## 2023-10-30 NOTE — HOSPICE
PRN visit made per family's request for c/o upper abd pain and SOB not relieved with medications. Pt sitting in recliner AOX3, states that pain is better but breathing has not changed. RR = 40, increased work of breathing with use of accessory muscles, Pt recieving O2 9L, Sats 977%. Pt having difficulty talking secondary to SOB. Pt's BS are coarse bilaterally with exp wheezing Pt was given 40mg lasix and NTG x3 prior to nurse's arrival Pt also received 1mg Dilaudid x2 and Lorazepam 1mg  within 1 hr prior to nurse's arrival Pt lives alone but daughter and son in law present. Dr Chelsie Mariscal consulted. Received order to transfer Pt to [facility] for [facility] level of care. Reviewed order with Pt and daughter. Both state understanding and agreement with POC. Report called to [facility]. This nurse waited with Pt and family for transport to [facility]. Pt remained visibly SOB but stated that he was feeling better. Rates pain 1/10, SOB 7/10. Mild confusion/forgetfulness noted. Pt's meds, DNR and personal belongings sent with him.  [facility] number provided to family

## (undated) DEVICE — MEDI-TRACE CADENCE ADULT, DEFIBRILLATION ELECTRODE -RTS  (10 PR/PK) - PHYSIO-CONTROL: Brand: MEDI-TRACE CADENCE

## (undated) DEVICE — ADMINISTRATION SET 72 IN SINGLE LUER LCK NAMIC

## (undated) DEVICE — NEEDLE ANGIO 18GA L9CM NRML 1 WALL SMOOTH FINISH CLR HUB FOR

## (undated) DEVICE — HEART CATH-SFMC: Brand: MEDLINE INDUSTRIES, INC.

## (undated) DEVICE — GUIDEWIRE VASC L180CM DIA0.035IN 3MM PTFE J TIP EXCHG FIX

## (undated) DEVICE — PINNACLE INTRODUCER SHEATH: Brand: PINNACLE

## (undated) DEVICE — CATHETER KIT JL 4 JL5 6 FRX100 CM 110 CM 145 PGTL DXTERITY